# Patient Record
Sex: FEMALE | Race: WHITE | NOT HISPANIC OR LATINO
[De-identification: names, ages, dates, MRNs, and addresses within clinical notes are randomized per-mention and may not be internally consistent; named-entity substitution may affect disease eponyms.]

---

## 2019-01-21 ENCOUNTER — FORM ENCOUNTER (OUTPATIENT)
Age: 64
End: 2019-01-21

## 2019-01-22 ENCOUNTER — OUTPATIENT (OUTPATIENT)
Dept: OUTPATIENT SERVICES | Facility: HOSPITAL | Age: 64
LOS: 1 days | End: 2019-01-22
Payer: COMMERCIAL

## 2019-01-22 ENCOUNTER — APPOINTMENT (OUTPATIENT)
Dept: ORTHOPEDIC SURGERY | Facility: CLINIC | Age: 64
End: 2019-01-22
Payer: COMMERCIAL

## 2019-01-22 VITALS — BODY MASS INDEX: 21.38 KG/M2 | WEIGHT: 133 LBS | HEIGHT: 66 IN

## 2019-01-22 DIAGNOSIS — Z78.9 OTHER SPECIFIED HEALTH STATUS: ICD-10-CM

## 2019-01-22 DIAGNOSIS — Z98.89 OTHER SPECIFIED POSTPROCEDURAL STATES: Chronic | ICD-10-CM

## 2019-01-22 DIAGNOSIS — M16.10 UNILATERAL PRIMARY OSTEOARTHRITIS, UNSPECIFIED HIP: ICD-10-CM

## 2019-01-22 PROCEDURE — 73502 X-RAY EXAM HIP UNI 2-3 VIEWS: CPT | Mod: 26,LT

## 2019-01-22 PROCEDURE — 73502 X-RAY EXAM HIP UNI 2-3 VIEWS: CPT

## 2019-01-22 PROCEDURE — 99203 OFFICE O/P NEW LOW 30 MIN: CPT

## 2019-01-22 NOTE — REASON FOR VISIT
[Initial Visit] : an initial visit for [Artificial Hip Joint] : artificial hip joint [Hip Pain] : hip pain

## 2019-01-22 NOTE — PROCEDURE
[Injection] : Injection [Left] : of the left [Greater Trochanter] : greater trochanteric bursa [Inflammation] : Inflammation [Diagnostic] : Diagnostic [Patient] : patient [Alcohol] : Alcohol [Betadine] : Betadine [20] : a 20-gauge [1% Lidocaine___(mL)] : [unfilled] mL of 1% Lidocaine [Triamcinolone 40mg/mL___(mL)] : [unfilled] ~UmL of 40mg/mL triamcinolone [Tolerated Well] : The patient tolerated the procedure well [None] : none

## 2019-01-23 PROBLEM — Z78.9 DOES NOT USE ILLICIT DRUGS: Status: ACTIVE | Noted: 2019-01-22

## 2019-01-23 PROBLEM — M16.10 ARTHRITIS OF HIP: Status: RESOLVED | Noted: 2019-01-22 | Resolved: 2019-01-23

## 2019-01-23 PROBLEM — Z78.9 CONSUMES ALCOHOL: Status: ACTIVE | Noted: 2019-01-22

## 2019-01-29 NOTE — PHYSICAL EXAM
[de-identified] : Gen: NAD/AAOx3, cooperative\par HEENT: NC/AT\par CV: RRR\par Resp: nonlabored breathing, equal chest rise\par Abd: soft/nontender/nondistended\par R hip:\par - Anterior incision well healed, NTTP, no fluctuance\par - ROM 0-110 flexion, 25IR, 60ER\par - Motor 5/5\par - NVID\par L hip:\par - ROM 0-100 flexion, 20 IR, 50 ER\par - TTP posterolateral aspect of greater trochanter\par - Painless FADIR, trochanteric pain on ANAT\par - Motor 5/5\par - NVID [de-identified] : XR demonstrate R СЕРГЕЙ in unchanged alignment from prior studies. Moderate osteoarthritis of left hip.

## 2019-01-29 NOTE — HISTORY OF PRESENT ILLNESS
[de-identified] : 64y/o female s/p DAA R СЕРГЕЙ by Dr. Alejo 2 years ago, which is doing very well. Complaining of progressive left hip pain centered over the lateral aspect of the greater trochanter, exacerbated by certain activities and movements. Still very active, enjoys multiple sports, doesn't feel limited in her ADLs.

## 2019-01-29 NOTE — DISCUSSION/SUMMARY
[de-identified] : 64y/o female 2y s/p R СЕРГЕЙ, doing well, with L hip OA and trochanteric bursitis\par - Radiographically significant L hip OA does not seem to be producing significant clinical symptoms based on exam, would hold off on СЕРГЕЙ at this point\par - Pt indicated that the trochanteric pain decreased from 5.5/10 to 1.5/10 after injection\par - RTC 2wk for f/u injection if still painful; if symptoms relieved then will space out visits to 6mo...

## 2019-02-05 ENCOUNTER — APPOINTMENT (OUTPATIENT)
Dept: ORTHOPEDIC SURGERY | Facility: CLINIC | Age: 64
End: 2019-02-05

## 2019-04-03 ENCOUNTER — APPOINTMENT (OUTPATIENT)
Dept: ORTHOPEDIC SURGERY | Facility: CLINIC | Age: 64
End: 2019-04-03
Payer: COMMERCIAL

## 2019-04-03 DIAGNOSIS — M70.62 TROCHANTERIC BURSITIS, LEFT HIP: ICD-10-CM

## 2019-04-03 PROCEDURE — 99212 OFFICE O/P EST SF 10 MIN: CPT

## 2019-04-12 ENCOUNTER — RX RENEWAL (OUTPATIENT)
Age: 64
End: 2019-04-12

## 2019-05-17 PROBLEM — M70.62 TROCHANTERIC BURSITIS OF LEFT HIP: Status: ACTIVE | Noted: 2019-01-22

## 2019-05-17 NOTE — ASSESSMENT
[FreeTextEntry1] : Assessment\par #1 moderate to severe left hip arthritis with atypical pain presentation\par \par Plan \par #1 Yulia has a very atypical presentation of her hip arthritis pain, similar to on her right side before that was replaced she always had a right lateral size greater trochanter pain. She is continuing to have the same on her left side now, it didn't give much better after an injection done previously in early spring, she is ready to have a total hip done for the lateral pain and loss of range of motion. We'll schedule this for sometime in the summer. She'll have to undergo the normal preoperative clearance pathway. Risks benefits alternatives explained to clear detail. No other complaints at this time.

## 2019-05-17 NOTE — HISTORY OF PRESENT ILLNESS
[de-identified] : Following of her left hip, she does have moderate arthritis but this presents laterally as trochanter pain and lateral pain. She has minimal if any anterior groin pain which is atypical of hip arthritis pain. She is ready to have a total hip replacement scheduled, she had the same symptoms on her right side before this was replaced now she is doing great. No other complaints at this time.

## 2019-05-17 NOTE — PHYSICAL EXAM
[de-identified] : Gen: NAD/AAOx3, cooperative\par HEENT: NC/AT\par CV: RRR\par Resp: nonlabored breathing, equal chest rise\par Abd: soft/nontender/nondistended\par R hip:\par - Anterior incision well healed, NTTP, no fluctuance\par - ROM 0-110 flexion, 25IR, 60ER\par - Motor 5/5\par - NVID\par L hip:\par - ROM 0-100 flexion, 20 IR, 50 ER\par - TTP posterolateral aspect of greater trochanter\par - Painless FADIR, trochanteric pain on ANAT\par - Motor 5/5\par - NVID

## 2019-06-03 VITALS
OXYGEN SATURATION: 98 % | DIASTOLIC BLOOD PRESSURE: 45 MMHG | HEART RATE: 58 BPM | WEIGHT: 138.23 LBS | HEIGHT: 66 IN | SYSTOLIC BLOOD PRESSURE: 90 MMHG | TEMPERATURE: 98 F | RESPIRATION RATE: 16 BRPM

## 2019-06-03 NOTE — H&P ADULT - NSICDXPASTSURGICALHX_GEN_ALL_CORE_FT
PAST SURGICAL HISTORY:  H/O right inguinal hernia repair     H/O:  section     History of hip replacement, total, right

## 2019-06-03 NOTE — H&P ADULT - PROBLEM SELECTOR PLAN 1
Admit to orthopedics.  Patient presents for elective left total hip arthroplasty.  Medically optimized and cleared by Dr. Dewitt.

## 2019-06-03 NOTE — PATIENT PROFILE ADULT - VISION (WITH CORRECTIVE LENSES IF THE PATIENT USUALLY WEARS THEM):
Normal vision: sees adequately in most situations; can see medication labels, newsprint contacts for distance (not currently in)/Partially impaired: cannot see medication labels or newsprint, but can see obstacles in path, and the surrounding layout; can count fingers at arm's length

## 2019-06-03 NOTE — PATIENT PROFILE ADULT - NSPROMEDSBROUGHTTOHOSP_GEN_A_NUR
no yes/Pt informed on hospital policy for taking own medications while in hospital.  Pt also instructed on safety risks of taking own medications.   Pt verbalized understanding and states she will not take her own medications.

## 2019-06-03 NOTE — PATIENT PROFILE ADULT - NSTRANSFERBELONGINGSDISPO_GEN_A_NUR
on unit on unit/given to security/with patient given to security/clothes and glasses on unit; cash ($20), credit card x 2, cellphone, ipad, wallet, earbuds, wallet (with: cash, ID, insurance card) to/with patient

## 2019-06-03 NOTE — PRE-OP CHECKLIST - PATIENT'S PERSONAL PROPERTY GIVEN TO
clothes and glasses on unit; cellphone, ipad, wallet, earbuds, wallet (with: cash, ID, insurance card) to security/on unit/security/safe on unit/clothes and glasses on unit; cash ($20), credit card x 2, cellphone, ipad, wallet, earbuds, wallet (with: cash, ID, insurance card) to security/security/safe

## 2019-06-03 NOTE — H&P ADULT - NSHPPHYSICALEXAM_GEN_ALL_CORE
General: Alert and oriented, NAD  MSK:  Decreased ROM of left hip secondary to pain.  Remainder of PE as per medical clearance General: Alert and oriented, NAD  MSK: left hip skin intact, no erythema/ecchymosis/sts. SLT INTACT, DP/PT 2+, EHL/TA/GS 5/5.   Decreased ROM of left hip secondary to pain.  Remainder of PE as per medical clearance

## 2019-06-03 NOTE — H&P ADULT - HISTORY OF PRESENT ILLNESS
62 yo F with left hip pain x  Presents today for elective left total hip replacement. 64 yo F with left hip pain x 6months. Pt. states she had right thr performed 3y ago and knew eventually left one would need to get done. Pt. denies any radiation of pain. Pt. is unable to exercise like she wants to 2/2 to pain (swim, run, yoga). Pt. states left hip hurts all the time, but takes meloxicam for pain relief. Pt. was given a left hip injection for what was thought to be bursitis which did not relieve pain.   Presents today for elective left total hip replacement.

## 2019-06-03 NOTE — H&P ADULT - NSHPLABSRESULTS_GEN_ALL_CORE
Preop cbc, bmp, pt/inr, ptt, ua wnl reviewed by medical clearance.  Nasal swab DOS  preop cxr wnl reviewed by medical clearance  preop ekg wnl reviewed by medical clearance

## 2019-06-04 ENCOUNTER — INPATIENT (INPATIENT)
Facility: HOSPITAL | Age: 64
LOS: 0 days | Discharge: HOME | DRG: 470 | End: 2019-06-05
Attending: ORTHOPAEDIC SURGERY | Admitting: ORTHOPAEDIC SURGERY
Payer: COMMERCIAL

## 2019-06-04 ENCOUNTER — TRANSCRIPTION ENCOUNTER (OUTPATIENT)
Age: 64
End: 2019-06-04

## 2019-06-04 ENCOUNTER — APPOINTMENT (OUTPATIENT)
Dept: ORTHOPEDIC SURGERY | Facility: HOSPITAL | Age: 64
End: 2019-06-04
Payer: COMMERCIAL

## 2019-06-04 DIAGNOSIS — Z98.890 OTHER SPECIFIED POSTPROCEDURAL STATES: Chronic | ICD-10-CM

## 2019-06-04 DIAGNOSIS — M19.90 UNSPECIFIED OSTEOARTHRITIS, UNSPECIFIED SITE: ICD-10-CM

## 2019-06-04 DIAGNOSIS — Z98.89 OTHER SPECIFIED POSTPROCEDURAL STATES: Chronic | ICD-10-CM

## 2019-06-04 DIAGNOSIS — Z86.69 PERSONAL HISTORY OF OTHER DISEASES OF THE NERVOUS SYSTEM AND SENSE ORGANS: ICD-10-CM

## 2019-06-04 DIAGNOSIS — Z96.641 PRESENCE OF RIGHT ARTIFICIAL HIP JOINT: Chronic | ICD-10-CM

## 2019-06-04 LAB
BASOPHILS # BLD AUTO: 0.04 K/UL — SIGNIFICANT CHANGE UP (ref 0–0.2)
BASOPHILS NFR BLD AUTO: 0.4 % — SIGNIFICANT CHANGE UP (ref 0–2)
EOSINOPHIL # BLD AUTO: 0.09 K/UL — SIGNIFICANT CHANGE UP (ref 0–0.5)
EOSINOPHIL NFR BLD AUTO: 0.9 % — SIGNIFICANT CHANGE UP (ref 0–6)
HCT VFR BLD CALC: 38.7 % — SIGNIFICANT CHANGE UP (ref 34.5–45)
HGB BLD-MCNC: 12.2 G/DL — SIGNIFICANT CHANGE UP (ref 11.5–15.5)
IMM GRANULOCYTES NFR BLD AUTO: 1 % — SIGNIFICANT CHANGE UP (ref 0–1.5)
LYMPHOCYTES # BLD AUTO: 1.28 K/UL — SIGNIFICANT CHANGE UP (ref 1–3.3)
LYMPHOCYTES # BLD AUTO: 12.6 % — LOW (ref 13–44)
MCHC RBC-ENTMCNC: 31.3 PG — SIGNIFICANT CHANGE UP (ref 27–34)
MCHC RBC-ENTMCNC: 31.5 GM/DL — LOW (ref 32–36)
MCV RBC AUTO: 99.2 FL — SIGNIFICANT CHANGE UP (ref 80–100)
MONOCYTES # BLD AUTO: 0.24 K/UL — SIGNIFICANT CHANGE UP (ref 0–0.9)
MONOCYTES NFR BLD AUTO: 2.4 % — SIGNIFICANT CHANGE UP (ref 2–14)
NEUTROPHILS # BLD AUTO: 8.39 K/UL — HIGH (ref 1.8–7.4)
NEUTROPHILS NFR BLD AUTO: 82.7 % — HIGH (ref 43–77)
NRBC # BLD: 0 /100 WBCS — SIGNIFICANT CHANGE UP (ref 0–0)
PLATELET # BLD AUTO: 172 K/UL — SIGNIFICANT CHANGE UP (ref 150–400)
RBC # BLD: 3.9 M/UL — SIGNIFICANT CHANGE UP (ref 3.8–5.2)
RBC # FLD: 12.8 % — SIGNIFICANT CHANGE UP (ref 10.3–14.5)
WBC # BLD: 10.14 K/UL — SIGNIFICANT CHANGE UP (ref 3.8–10.5)
WBC # FLD AUTO: 10.14 K/UL — SIGNIFICANT CHANGE UP (ref 3.8–10.5)

## 2019-06-04 PROCEDURE — 27130 TOTAL HIP ARTHROPLASTY: CPT | Mod: LT

## 2019-06-04 PROCEDURE — 99223 1ST HOSP IP/OBS HIGH 75: CPT

## 2019-06-04 PROCEDURE — 72170 X-RAY EXAM OF PELVIS: CPT | Mod: 26

## 2019-06-04 RX ORDER — HYDROMORPHONE HYDROCHLORIDE 2 MG/ML
0.5 INJECTION INTRAMUSCULAR; INTRAVENOUS; SUBCUTANEOUS EVERY 4 HOURS
Refills: 0 | Status: DISCONTINUED | OUTPATIENT
Start: 2019-06-04 | End: 2019-06-04

## 2019-06-04 RX ORDER — SENNA PLUS 8.6 MG/1
2 TABLET ORAL AT BEDTIME
Refills: 0 | Status: DISCONTINUED | OUTPATIENT
Start: 2019-06-04 | End: 2019-06-05

## 2019-06-04 RX ORDER — MORPHINE SULFATE 50 MG/1
4 CAPSULE, EXTENDED RELEASE ORAL ONCE
Refills: 0 | Status: DISCONTINUED | OUTPATIENT
Start: 2019-06-04 | End: 2019-06-05

## 2019-06-04 RX ORDER — OXYCODONE HYDROCHLORIDE 5 MG/1
5 TABLET ORAL EVERY 4 HOURS
Refills: 0 | Status: DISCONTINUED | OUTPATIENT
Start: 2019-06-04 | End: 2019-06-05

## 2019-06-04 RX ORDER — DIPHENHYDRAMINE HCL 50 MG
25 CAPSULE ORAL AT BEDTIME
Refills: 0 | Status: DISCONTINUED | OUTPATIENT
Start: 2019-06-04 | End: 2019-06-05

## 2019-06-04 RX ORDER — MAGNESIUM HYDROXIDE 400 MG/1
30 TABLET, CHEWABLE ORAL DAILY
Refills: 0 | Status: DISCONTINUED | OUTPATIENT
Start: 2019-06-04 | End: 2019-06-05

## 2019-06-04 RX ORDER — POLYETHYLENE GLYCOL 3350 17 G/17G
17 POWDER, FOR SOLUTION ORAL DAILY
Refills: 0 | Status: DISCONTINUED | OUTPATIENT
Start: 2019-06-04 | End: 2019-06-05

## 2019-06-04 RX ORDER — MORPHINE SULFATE 50 MG/1
2 CAPSULE, EXTENDED RELEASE ORAL
Refills: 0 | Status: DISCONTINUED | OUTPATIENT
Start: 2019-06-04 | End: 2019-06-05

## 2019-06-04 RX ORDER — ASPIRIN/CALCIUM CARB/MAGNESIUM 324 MG
325 TABLET ORAL
Refills: 0 | Status: DISCONTINUED | OUTPATIENT
Start: 2019-06-04 | End: 2019-06-05

## 2019-06-04 RX ORDER — CELECOXIB 200 MG/1
200 CAPSULE ORAL
Refills: 0 | Status: DISCONTINUED | OUTPATIENT
Start: 2019-06-04 | End: 2019-06-05

## 2019-06-04 RX ORDER — TRAMADOL HYDROCHLORIDE 50 MG/1
50 TABLET ORAL EVERY 8 HOURS
Refills: 0 | Status: DISCONTINUED | OUTPATIENT
Start: 2019-06-04 | End: 2019-06-05

## 2019-06-04 RX ORDER — KETOROLAC TROMETHAMINE 30 MG/ML
15 SYRINGE (ML) INJECTION ONCE
Refills: 0 | Status: DISCONTINUED | OUTPATIENT
Start: 2019-06-04 | End: 2019-06-05

## 2019-06-04 RX ORDER — CEFAZOLIN SODIUM 1 G
2000 VIAL (EA) INJECTION EVERY 8 HOURS
Refills: 0 | Status: DISCONTINUED | OUTPATIENT
Start: 2019-06-04 | End: 2019-06-04

## 2019-06-04 RX ORDER — DOCUSATE SODIUM 100 MG
100 CAPSULE ORAL THREE TIMES A DAY
Refills: 0 | Status: DISCONTINUED | OUTPATIENT
Start: 2019-06-04 | End: 2019-06-05

## 2019-06-04 RX ORDER — PANTOPRAZOLE SODIUM 20 MG/1
40 TABLET, DELAYED RELEASE ORAL
Refills: 0 | Status: DISCONTINUED | OUTPATIENT
Start: 2019-06-04 | End: 2019-06-05

## 2019-06-04 RX ORDER — OXYCODONE HYDROCHLORIDE 5 MG/1
10 TABLET ORAL EVERY 4 HOURS
Refills: 0 | Status: DISCONTINUED | OUTPATIENT
Start: 2019-06-04 | End: 2019-06-05

## 2019-06-04 RX ORDER — SODIUM CHLORIDE 9 MG/ML
1000 INJECTION, SOLUTION INTRAVENOUS
Refills: 0 | Status: DISCONTINUED | OUTPATIENT
Start: 2019-06-04 | End: 2019-06-05

## 2019-06-04 RX ORDER — ONDANSETRON 8 MG/1
4 TABLET, FILM COATED ORAL EVERY 6 HOURS
Refills: 0 | Status: DISCONTINUED | OUTPATIENT
Start: 2019-06-04 | End: 2019-06-05

## 2019-06-04 RX ORDER — ACETAMINOPHEN 500 MG
650 TABLET ORAL EVERY 6 HOURS
Refills: 0 | Status: DISCONTINUED | OUTPATIENT
Start: 2019-06-04 | End: 2019-06-05

## 2019-06-04 RX ORDER — CEFAZOLIN SODIUM 1 G
2000 VIAL (EA) INJECTION EVERY 8 HOURS
Refills: 0 | Status: COMPLETED | OUTPATIENT
Start: 2019-06-04 | End: 2019-06-04

## 2019-06-04 RX ADMIN — Medication 100 MILLIGRAM(S): at 22:02

## 2019-06-04 RX ADMIN — TRAMADOL HYDROCHLORIDE 50 MILLIGRAM(S): 50 TABLET ORAL at 23:02

## 2019-06-04 RX ADMIN — TRAMADOL HYDROCHLORIDE 50 MILLIGRAM(S): 50 TABLET ORAL at 15:56

## 2019-06-04 RX ADMIN — Medication 2000 MILLIGRAM(S): at 22:02

## 2019-06-04 RX ADMIN — Medication 650 MILLIGRAM(S): at 18:13

## 2019-06-04 RX ADMIN — SODIUM CHLORIDE 120 MILLILITER(S): 9 INJECTION, SOLUTION INTRAVENOUS at 22:02

## 2019-06-04 RX ADMIN — Medication 325 MILLIGRAM(S): at 18:13

## 2019-06-04 RX ADMIN — TRAMADOL HYDROCHLORIDE 50 MILLIGRAM(S): 50 TABLET ORAL at 22:02

## 2019-06-04 RX ADMIN — TRAMADOL HYDROCHLORIDE 50 MILLIGRAM(S): 50 TABLET ORAL at 15:27

## 2019-06-04 RX ADMIN — Medication 2000 MILLIGRAM(S): at 15:27

## 2019-06-04 RX ADMIN — Medication 100 MILLIGRAM(S): at 15:26

## 2019-06-04 RX ADMIN — Medication 650 MILLIGRAM(S): at 23:31

## 2019-06-04 NOTE — PHYSICAL THERAPY INITIAL EVALUATION ADULT - CRITERIA FOR SKILLED THERAPEUTIC INTERVENTIONS
rehab potential/anticipated discharge recommendation/therapy frequency/impairments found/anticipated equipment needs at discharge

## 2019-06-04 NOTE — CONSULT NOTE ADULT - SUBJECTIVE AND OBJECTIVE BOX
CC: Pain is controlled. No complaints.   Feeling well.   Tolerates PO diet (+); Urination (+); Walked with PT (+)  Denies cp, sob, dizziness, HA, abdominal pain, n/v.  Rest of ROS negative.     Vital Signs Last 24 Hrs  T(C): 36.2 (04 Jun 2019 16:27), Max: 36.4 (04 Jun 2019 12:56)  T(F): 97.2 (04 Jun 2019 16:27), Max: 97.6 (04 Jun 2019 14:02)  HR: 59 (04 Jun 2019 16:27) (50 - 72)  BP: 100/62 (04 Jun 2019 16:27) (93/50 - 106/55)  BP(mean): 70 (04 Jun 2019 13:26) (67 - 79)  RR: 18 (04 Jun 2019 16:27) (12 - 20)  SpO2: 97% (04 Jun 2019 16:27) (94% - 100%)    PHYSICAL EXAMINATION  * General: Not in acute distress. Awake and alert. Lying comfortably in bed.  * Head: Normocephalic, atraumatic.  * HEENT: ears no discharge, eyes PERRLA, nose no discharge, throat no exudates, normal tonsils.  * Neck: no JVD, supple.  * Lungs: Clear to auscultation, no rales, no wheezes.  * Cardio: Regular rate and rhythm, no murmurs, no rubs, no gallops. Good peripheral pulses.  * Abdomen: Soft, non-tender, non-distended, tympanic to percussion, no rebound, no guarding, no rigidity. Bowel sounds present. No suprapubic or CVA tenderness.  * Extremities: Acyanotic, no edema.  * Skin: Warm and dry.  * Neuro: Alert and oriented x 3. No focal deficits. Motor strength is 5/5 throughout. Sensation intact. Cranial nerves II-XII grossly intact.                           12.2   10.14 )-----------( 172      ( 04 Jun 2019 10:39 )             38.7     MEDICATIONS  (STANDING):  acetaminophen   Tablet .. 650 milliGRAM(s) Oral every 6 hours  aspirin enteric coated 325 milliGRAM(s) Oral two times a day  ceFAZolin  Injectable. 2000 milliGRAM(s) IV Push every 8 hours  celecoxib 200 milliGRAM(s) Oral two times a day  docusate sodium 100 milliGRAM(s) Oral three times a day  ketorolac   Injectable 15 milliGRAM(s) IV Push once  lactated ringers. 1000 milliLiter(s) (120 mL/Hr) IV Continuous <Continuous>  morphine  - Injectable 4 milliGRAM(s) IV Push once  pantoprazole    Tablet 40 milliGRAM(s) Oral before breakfast  polyethylene glycol 3350 17 Gram(s) Oral daily  traMADol 50 milliGRAM(s) Oral every 8 hours    MEDICATIONS  (PRN):  aluminum hydroxide/magnesium hydroxide/simethicone Suspension 30 milliLiter(s) Oral four times a day PRN Indigestion  diphenhydrAMINE 25 milliGRAM(s) Oral at bedtime PRN Insomnia  HYDROmorphone  Injectable 0.5 milliGRAM(s) IV Push every 4 hours PRN Severe Pain (7 - 10)  magnesium hydroxide Suspension 30 milliLiter(s) Oral daily PRN Constipation  morphine  - Injectable 2 milliGRAM(s) IV Push every 3 hours PRN Severe Pain (7 - 10)  ondansetron Injectable 4 milliGRAM(s) IV Push every 6 hours PRN Nausea and/or Vomiting  oxyCODONE    IR 5 milliGRAM(s) Oral every 4 hours PRN Mild Pain (1 - 3)  oxyCODONE    IR 10 milliGRAM(s) Oral every 4 hours PRN Moderate Pain (4 - 6)  senna 2 Tablet(s) Oral at bedtime PRN Constipation

## 2019-06-04 NOTE — CONSULT NOTE ADULT - ASSESSMENT
64 yo F, PMH of hip OA and sleep apnea. c/o chronic left hip pain x 6 months. Admitted for elective left total hip replacement by Dr. Quezada, now POD-0. Medicine consulted for comanagement.    1) Post-op state  * OOB-C  * Physical therapy evaluation -did 1st session with PT. rec for home.  * Aggressive incentive spirometry  * Pain control and bowel regimen  * Mechanical LE ppx     2) VTE ppx  *  bid.   * PPI po.

## 2019-06-04 NOTE — CONSULT NOTE ADULT - CONSULT REASON
Co-management    64 yo F, PMH of hip OA and sleep apnea. c/o chronic left hip pain x 6 months. Admitted for elective left total hip replacement by Dr. Quezada, now POD-0. Medicine consulted for comanagement.    PAST MEDICAL & SURGICAL HISTORY:  Hyperlipidemia: per H&amp;P, pt denies  H/O sleep apnea: uses mouth guard.  OA (osteoarthritis)  DJD (degenerative joint disease)  History of nasal septoplasty  History of hip replacement, total, right  H/O right inguinal hernia repair  H/O:  section    Social: Denies smoking, etoh, drugs.     Allergies: NKDA    Home Medications:  aspirin 81 mg oral tablet: 1 tab(s) orally once a day (2019 06:29)  Caltrate 600 mg oral tablet: 1 tab(s) orally once a day (2019 06:29)  Fish Oil oral capsule: 1 cap(s) orally once a day (2019 06:29)  Ginger Root oral capsule: 1 cap(s) orally once a day (2019 06:29)  meloxicam 15 mg oral tablet: 1 tab(s) orally once a day, As Needed (2019 06:29)  Multiple Vitamins oral capsule: 1 cap(s) orally once a day (2019 06:29)  Probiotic Formula oral capsule: 1 cap(s) orally once a day (2019 06:29)  Turmeric 500 mg oral capsule: 2 cap(s) orally once a day (2019 06:29)  Vitamin B-100 oral tablet: 1 tab(s) orally once a day (2019 06:29)

## 2019-06-05 ENCOUNTER — TRANSCRIPTION ENCOUNTER (OUTPATIENT)
Age: 64
End: 2019-06-05

## 2019-06-05 VITALS
HEART RATE: 56 BPM | SYSTOLIC BLOOD PRESSURE: 91 MMHG | RESPIRATION RATE: 16 BRPM | DIASTOLIC BLOOD PRESSURE: 54 MMHG | OXYGEN SATURATION: 97 %

## 2019-06-05 LAB
ANION GAP SERPL CALC-SCNC: 7 MMOL/L — SIGNIFICANT CHANGE UP (ref 5–17)
BUN SERPL-MCNC: 15 MG/DL — SIGNIFICANT CHANGE UP (ref 7–23)
CALCIUM SERPL-MCNC: 9.7 MG/DL — SIGNIFICANT CHANGE UP (ref 8.4–10.5)
CHLORIDE SERPL-SCNC: 105 MMOL/L — SIGNIFICANT CHANGE UP (ref 96–108)
CO2 SERPL-SCNC: 29 MMOL/L — SIGNIFICANT CHANGE UP (ref 22–31)
CREAT SERPL-MCNC: 0.66 MG/DL — SIGNIFICANT CHANGE UP (ref 0.5–1.3)
GLUCOSE SERPL-MCNC: 117 MG/DL — HIGH (ref 70–99)
HCT VFR BLD CALC: 33 % — LOW (ref 34.5–45)
HCV AB S/CO SERPL IA: 0.13 S/CO — SIGNIFICANT CHANGE UP
HCV AB SERPL-IMP: SIGNIFICANT CHANGE UP
HGB BLD-MCNC: 10.7 G/DL — LOW (ref 11.5–15.5)
MCHC RBC-ENTMCNC: 31.8 PG — SIGNIFICANT CHANGE UP (ref 27–34)
MCHC RBC-ENTMCNC: 32.4 GM/DL — SIGNIFICANT CHANGE UP (ref 32–36)
MCV RBC AUTO: 97.9 FL — SIGNIFICANT CHANGE UP (ref 80–100)
MRSA PCR RESULT.: SIGNIFICANT CHANGE UP
NRBC # BLD: 0 /100 WBCS — SIGNIFICANT CHANGE UP (ref 0–0)
PLATELET # BLD AUTO: 177 K/UL — SIGNIFICANT CHANGE UP (ref 150–400)
POTASSIUM SERPL-MCNC: 4.7 MMOL/L — SIGNIFICANT CHANGE UP (ref 3.5–5.3)
POTASSIUM SERPL-SCNC: 4.7 MMOL/L — SIGNIFICANT CHANGE UP (ref 3.5–5.3)
RBC # BLD: 3.37 M/UL — LOW (ref 3.8–5.2)
RBC # FLD: 12.9 % — SIGNIFICANT CHANGE UP (ref 10.3–14.5)
S AUREUS DNA NOSE QL NAA+PROBE: SIGNIFICANT CHANGE UP
SODIUM SERPL-SCNC: 141 MMOL/L — SIGNIFICANT CHANGE UP (ref 135–145)
WBC # BLD: 11.07 K/UL — HIGH (ref 3.8–10.5)
WBC # FLD AUTO: 11.07 K/UL — HIGH (ref 3.8–10.5)

## 2019-06-05 PROCEDURE — 99232 SBSQ HOSP IP/OBS MODERATE 35: CPT

## 2019-06-05 RX ORDER — ASPIRIN/CALCIUM CARB/MAGNESIUM 324 MG
1 TABLET ORAL
Qty: 60 | Refills: 0
Start: 2019-06-05 | End: 2019-07-04

## 2019-06-05 RX ORDER — TRAMADOL HYDROCHLORIDE 50 MG/1
25 TABLET ORAL EVERY 4 HOURS
Refills: 0 | Status: DISCONTINUED | OUTPATIENT
Start: 2019-06-05 | End: 2019-06-05

## 2019-06-05 RX ORDER — MELOXICAM 15 MG/1
1 TABLET ORAL
Qty: 30 | Refills: 0
Start: 2019-06-05 | End: 2019-07-04

## 2019-06-05 RX ORDER — TRAMADOL HYDROCHLORIDE 50 MG/1
1 TABLET ORAL
Qty: 40 | Refills: 0
Start: 2019-06-05 | End: 2019-06-11

## 2019-06-05 RX ORDER — ACETAMINOPHEN 500 MG
2 TABLET ORAL
Qty: 0 | Refills: 0 | DISCHARGE
Start: 2019-06-05

## 2019-06-05 RX ORDER — TRAMADOL HYDROCHLORIDE 50 MG/1
50 TABLET ORAL EVERY 4 HOURS
Refills: 0 | Status: DISCONTINUED | OUTPATIENT
Start: 2019-06-05 | End: 2019-06-05

## 2019-06-05 RX ORDER — OMEGA-3 ACID ETHYL ESTERS 1 G
1 CAPSULE ORAL
Qty: 0 | Refills: 0 | DISCHARGE

## 2019-06-05 RX ORDER — KETOROLAC TROMETHAMINE 30 MG/ML
15 SYRINGE (ML) INJECTION EVERY 8 HOURS
Refills: 0 | Status: DISCONTINUED | OUTPATIENT
Start: 2019-06-05 | End: 2019-06-05

## 2019-06-05 RX ORDER — DOCUSATE SODIUM 100 MG
1 CAPSULE ORAL
Qty: 0 | Refills: 0 | DISCHARGE
Start: 2019-06-05

## 2019-06-05 RX ORDER — SENNA PLUS 8.6 MG/1
2 TABLET ORAL
Qty: 0 | Refills: 0 | DISCHARGE
Start: 2019-06-05

## 2019-06-05 RX ORDER — POLYETHYLENE GLYCOL 3350 17 G/17G
17 POWDER, FOR SOLUTION ORAL
Qty: 0 | Refills: 0 | DISCHARGE
Start: 2019-06-05

## 2019-06-05 RX ORDER — MORPHINE SULFATE 50 MG/1
2 CAPSULE, EXTENDED RELEASE ORAL
Refills: 0 | Status: DISCONTINUED | OUTPATIENT
Start: 2019-06-05 | End: 2019-06-05

## 2019-06-05 RX ORDER — ASPIRIN/CALCIUM CARB/MAGNESIUM 324 MG
1 TABLET ORAL
Qty: 0 | Refills: 0 | DISCHARGE

## 2019-06-05 RX ORDER — MELOXICAM 15 MG/1
1 TABLET ORAL
Qty: 0 | Refills: 0 | DISCHARGE

## 2019-06-05 RX ADMIN — Medication 100 MILLIGRAM(S): at 06:12

## 2019-06-05 RX ADMIN — PANTOPRAZOLE SODIUM 40 MILLIGRAM(S): 20 TABLET, DELAYED RELEASE ORAL at 06:12

## 2019-06-05 RX ADMIN — POLYETHYLENE GLYCOL 3350 17 GRAM(S): 17 POWDER, FOR SOLUTION ORAL at 12:46

## 2019-06-05 RX ADMIN — TRAMADOL HYDROCHLORIDE 50 MILLIGRAM(S): 50 TABLET ORAL at 06:12

## 2019-06-05 RX ADMIN — Medication 650 MILLIGRAM(S): at 06:11

## 2019-06-05 RX ADMIN — SODIUM CHLORIDE 120 MILLILITER(S): 9 INJECTION, SOLUTION INTRAVENOUS at 06:11

## 2019-06-05 RX ADMIN — Medication 650 MILLIGRAM(S): at 00:31

## 2019-06-05 RX ADMIN — Medication 15 MILLIGRAM(S): at 13:09

## 2019-06-05 RX ADMIN — Medication 325 MILLIGRAM(S): at 06:12

## 2019-06-05 RX ADMIN — Medication 650 MILLIGRAM(S): at 12:46

## 2019-06-05 RX ADMIN — Medication 100 MILLIGRAM(S): at 13:09

## 2019-06-05 NOTE — DISCHARGE NOTE PROVIDER - HOSPITAL COURSE
Admit    OR left total hip replacement (anterior lateral approach)    Periop Antibx    DVT ppx    PT /OT- anterior and posterior hip precautions    Pain mgt    medical consult

## 2019-06-05 NOTE — PROGRESS NOTE ADULT - ASSESSMENT
62 yo F, PMH of hip OA and sleep apnea. c/o chronic left hip pain x 6 months. Admitted for elective left total hip replacement by Dr. Quezada, now POD-1. Medicine consulted for comanagement.    1) Post-op state  * OOB-C  * Physical therapy, cleared for home.  * Aggressive incentive spirometry  * Pain control and bowel regimen  * Mechanical LE ppx     2) VTE ppx  *  bid.   * PPI po.

## 2019-06-05 NOTE — PROGRESS NOTE ADULT - SUBJECTIVE AND OBJECTIVE BOX
Orthopaedic Surgery Post Operative Check    Procedure: Left total hip replacement   Surgeon: Dr. Quezada     Pt comfortable without complaints, pain controlled  Denies CP, SOB, N/V, numbness/tingling    Vital Signs Last 24 Hrs  T(C): 36.4 (06-04-19 @ 14:02), Max: 36.4 (06-04-19 @ 12:56)  T(F): 97.6 (06-04-19 @ 14:02), Max: 97.6 (06-04-19 @ 14:02)  HR: 71 (06-04-19 @ 14:02) (50 - 71)  BP: 101/58 (06-04-19 @ 14:02) (93/50 - 106/55)  BP(mean): 70 (06-04-19 @ 13:26) (67 - 79)  RR: 18 (06-04-19 @ 14:02) (12 - 20)  SpO2: 97% (06-04-19 @ 14:02) (94% - 100%)  AVSS    General: Pt Alert and oriented, NAD  DSG C/D/I left hip guaze/tegaderm   Pulses: DP pulses palpable bilateral lower extremities   Sensation: intact and equal to bilateral lower extremities   Motor: EHL/FHL/TA/GS 5/5 motor strength bilateral lower extremities                           12.2   10.14 )-----------( 172      ( 04 Jun 2019 10:39 )             38.7           Post-op X-Ray: prosthesis in place     A/P: 63yFemale POD#0 s/p   - Stable  - Pain Control  - DVT ppx: ASA/SCDs  - Post op abx: Ancef  - PT, WBS: PWB LLE   - f/u AM labs     Ortho Pager [phone]
CC: No overnight events, no complaints.   Feeling well, pain is overall controlled.  Tolerates PO diet (+); Urination (+); Walked with PT (+)  Denies cp, sob, dizziness, HA, abdominal pain, n/v.  Rest of ROS negative.     Vital Signs Last 24 Hrs  T(C): 36.2 (05 Jun 2019 06:26), Max: 36.8 (04 Jun 2019 22:17)  T(F): 97.1 (05 Jun 2019 06:26), Max: 98.2 (04 Jun 2019 22:17)  HR: 56 (05 Jun 2019 07:14) (52 - 59)  BP: 91/54 (05 Jun 2019 07:14) (90/55 - 100/62)  BP(mean): --  RR: 16 (05 Jun 2019 07:14) (16 - 18)  SpO2: 97% (05 Jun 2019 07:14) (93% - 97%)    PHYSICAL EXAMINATION  * General: Not in acute distress. Awake and alert. Lying comfortably in bed.  * Head: Normocephalic, atraumatic.  * HEENT: ears no discharge, eyes PERRLA, nose no discharge, throat no exudates, normal tonsils.  * Neck: no JVD, supple.  * Lungs: Clear to auscultation, no rales, no wheezes.  * Cardio: Regular rate and rhythm, no murmurs, no rubs, no gallops. Good peripheral pulses.  * Abdomen: Soft, non-tender, non-distended, tympanic to percussion, no rebound, no guarding, no rigidity. Bowel sounds present. No suprapubic or CVA tenderness.  * : Deferred.  * Extremities: Acyanotic, no edema.  * Skin: Warm and dry.  * Neuro: Alert and oriented x 3. No focal deficits. Motor strength is 5/5 throughout. Sensation intact. Cranial nerves II-XII grossly intact.                           10.7   11.07 )-----------( 177      ( 05 Jun 2019 06:48 )             33.0   06-05    141  |  105  |  15  ----------------------------<  117<H>  4.7   |  29  |  0.66    Ca    9.7      05 Jun 2019 06:48    MEDICATIONS  (STANDING):  acetaminophen   Tablet .. 650 milliGRAM(s) Oral every 6 hours  aspirin enteric coated 325 milliGRAM(s) Oral two times a day  celecoxib 200 milliGRAM(s) Oral two times a day  docusate sodium 100 milliGRAM(s) Oral three times a day  ketorolac   Injectable 15 milliGRAM(s) IV Push every 8 hours  pantoprazole    Tablet 40 milliGRAM(s) Oral before breakfast  polyethylene glycol 3350 17 Gram(s) Oral daily    MEDICATIONS  (PRN):  aluminum hydroxide/magnesium hydroxide/simethicone Suspension 30 milliLiter(s) Oral four times a day PRN Indigestion  morphine  - Injectable 2 milliGRAM(s) IV Push every 2 hours PRN breakthrough pain  ondansetron Injectable 4 milliGRAM(s) IV Push every 6 hours PRN Nausea and/or Vomiting  senna 2 Tablet(s) Oral at bedtime PRN Constipation  traMADol 25 milliGRAM(s) Oral every 4 hours PRN Moderate Pain (4 - 6)  traMADol 50 milliGRAM(s) Oral every 4 hours PRN Severe Pain (7 - 10)
Ortho Note    Pt comfortable without complaints, pain controlled  Denies CP, SOB, N/V, numbness/tingling     Vital Signs Last 24 Hrs  T(C): 36.2 (06-05-19 @ 06:26), Max: 36.2 (06-05-19 @ 06:26)  T(F): 97.1 (06-05-19 @ 06:26), Max: 97.1 (06-05-19 @ 06:26)  HR: 56 (06-05-19 @ 07:14) (56 - 57)  BP: 91/54 (06-05-19 @ 07:14) (91/54 - 94/56)  BP(mean): --  RR: 16 (06-05-19 @ 07:14) (16 - 16)  SpO2: 97% (06-05-19 @ 07:14) (93% - 97%)    General: Pt Alert and oriented, NAD  R hip DSG C/D/I  Pulses: 2+ DP/PT  Sensation: s/s/sp/dp/t intact  Motor: EHL/FHL/TA/GS 5/5                          10.7   11.07 )-----------( 177      ( 05 Jun 2019 06:48 )             33.0   05 Jun 2019 06:48    141    |  105    |  15     ----------------------------<  117    4.7     |  29     |  0.66     Ca    9.7        05 Jun 2019 06:48        A/P: 63F s/p L СЕРГЕЙ 6/4  - Stable  - Pain Control  - DVT ppx: ASA  - PT, WBS: WBAT  - dispo: home     Ortho Pager [phone]

## 2019-06-05 NOTE — OCCUPATIONAL THERAPY INITIAL EVALUATION ADULT - PERTINENT HX OF CURRENT PROBLEM, REHAB EVAL
64 yo F with left hip pain x 6months. Pt. states she had right thr performed 3y ago and knew eventually left one would need to get done. Pt. denies any radiation of pain. Pt. is unable to exercise like she wants to 2/2 to pain (swim, run, yoga). Pt. states left hip hurts all the time, but takes meloxicam for pain relief. Pt. was given a left hip injection for what was thought to be bursitis which did not relieve pain.

## 2019-06-05 NOTE — DISCHARGE NOTE NURSING/CASE MANAGEMENT/SOCIAL WORK - NSTRANSFERBELONGINGSDISPO_GEN_A_NUR
clothes and glasses on unit; cash ($20), credit card x 2, cellphone, ipad, wallet, earbuds, wallet (with: cash, ID, insurance card) to/given to security/with patient

## 2019-06-05 NOTE — DISCHARGE NOTE PROVIDER - NSDCFUADDINST_GEN_ALL_CORE_FT
50% protected weight on left leg operated hip using a rolling walker  Anterior and posterior hip precautions to prevent hip dislocation  No crossing your legs. Do not bend past your waist.  Use long-handled reach to pick things up if purchased.  Sit in a high chair.  If you do not have a high chair, use cushions on the chair  No strenuous activity, heavy lifting, driving or returning to work until cleared by MD.  You may shower - dressing is water-resistant, no soaking in bathtubs.  Keep dressing on your hip until the follow up appointment.  Try to have regular bowel movements, take stool softener or laxative if necessary.  May take Pepcid or Zantac for upset stomach.  Swelling may travel all the way down leg to foot, this is normal and will subside in a few weeks.  Call to schedule an appt with Dr. Quezada for follow up.    Contact your doctor if you experience: fever greater than 101.5, chills, chest pain, difficulty breathing, redness or excessive drainage around the incision, other concerns.  Follow up with your primary care provider.

## 2019-06-05 NOTE — DISCHARGE NOTE PROVIDER - CARE PROVIDER_API CALL
Daniel Quezada)  Orthopaedic Surgery  [address]  [place], [place] [address]  Phone: [phone]  Fax: [phone]  Follow Up Time:

## 2019-06-05 NOTE — OCCUPATIONAL THERAPY INITIAL EVALUATION ADULT - MANUAL MUSCLE TESTING RESULTS, REHAB EVAL
no strength deficits were identified/Bilateral upper extremities at least 3+/5 based on functional assessment

## 2019-06-05 NOTE — DISCHARGE NOTE NURSING/CASE MANAGEMENT/SOCIAL WORK - NSDCDPATPORTLINK_GEN_ALL_CORE
You can access the [facility] Portal, offered by [facility], by registering with the following website: [email or web address]

## 2019-06-05 NOTE — DISCHARGE NOTE PROVIDER - NSDCCPCAREPLAN_GEN_ALL_CORE_FT
PRINCIPAL DISCHARGE DIAGNOSIS  Diagnosis: DJD (degenerative joint disease)  Assessment and Plan of Treatment: DJD (degenerative joint disease)

## 2019-06-06 PROCEDURE — 86850 RBC ANTIBODY SCREEN: CPT

## 2019-06-06 PROCEDURE — 72170 X-RAY EXAM OF PELVIS: CPT

## 2019-06-06 PROCEDURE — 86803 HEPATITIS C AB TEST: CPT

## 2019-06-06 PROCEDURE — 36415 COLL VENOUS BLD VENIPUNCTURE: CPT

## 2019-06-06 PROCEDURE — 80048 BASIC METABOLIC PNL TOTAL CA: CPT

## 2019-06-06 PROCEDURE — 85027 COMPLETE CBC AUTOMATED: CPT

## 2019-06-06 PROCEDURE — 97116 GAIT TRAINING THERAPY: CPT

## 2019-06-06 PROCEDURE — C1713: CPT

## 2019-06-06 PROCEDURE — C1776: CPT

## 2019-06-06 PROCEDURE — 97161 PT EVAL LOW COMPLEX 20 MIN: CPT

## 2019-06-06 PROCEDURE — 85025 COMPLETE CBC W/AUTO DIFF WBC: CPT

## 2019-06-06 PROCEDURE — 86901 BLOOD TYPING SEROLOGIC RH(D): CPT

## 2019-06-06 PROCEDURE — 86900 BLOOD TYPING SEROLOGIC ABO: CPT

## 2019-06-10 DIAGNOSIS — M16.12 UNILATERAL PRIMARY OSTEOARTHRITIS, LEFT HIP: ICD-10-CM

## 2019-06-10 DIAGNOSIS — Z79.82 LONG TERM (CURRENT) USE OF ASPIRIN: ICD-10-CM

## 2019-06-10 DIAGNOSIS — G47.33 OBSTRUCTIVE SLEEP APNEA (ADULT) (PEDIATRIC): ICD-10-CM

## 2019-06-10 DIAGNOSIS — M25.552 PAIN IN LEFT HIP: ICD-10-CM

## 2019-06-10 DIAGNOSIS — Z96.641 PRESENCE OF RIGHT ARTIFICIAL HIP JOINT: ICD-10-CM

## 2019-06-11 PROBLEM — E78.5 HYPERLIPIDEMIA, UNSPECIFIED: Chronic | Status: ACTIVE | Noted: 2019-06-04

## 2019-06-11 PROBLEM — Z86.69 PERSONAL HISTORY OF OTHER DISEASES OF THE NERVOUS SYSTEM AND SENSE ORGANS: Chronic | Status: ACTIVE | Noted: 2019-06-03

## 2019-06-11 PROBLEM — M19.90 UNSPECIFIED OSTEOARTHRITIS, UNSPECIFIED SITE: Chronic | Status: ACTIVE | Noted: 2019-06-03

## 2019-06-19 ENCOUNTER — APPOINTMENT (OUTPATIENT)
Dept: ORTHOPEDIC SURGERY | Facility: CLINIC | Age: 64
End: 2019-06-19
Payer: COMMERCIAL

## 2019-06-19 PROCEDURE — 99024 POSTOP FOLLOW-UP VISIT: CPT

## 2019-06-19 NOTE — HISTORY OF PRESENT ILLNESS
[de-identified] : Following up for her left total hip, has well-controlled pain, no other complaints at this time.

## 2019-06-19 NOTE — ASSESSMENT
[FreeTextEntry1] : Assessment\par #1 status post left total hip\par Plan\par #1 wound care instructions given\par #2 continue aspirin until one month anniversary\par #3 begin physical therapy at one month anniversary, prescription given\par #4 followup at 10 week anniversary, x-rays at the next visit

## 2019-06-19 NOTE — PHYSICAL EXAM
[de-identified] : General: Not in acute distress, dressed appropriately, sitting on examination table\par Skin: Warm and dry, normal turgor, no rashes\par Neurological: AOx3, Cranial nerves grossly in tact\par Psych: Mood and affect appropriate\par \par Left Hip: Well healed surgical incision. No swelling edema erythema redness or drainage. Nontender.. ROM: Not assessed. 5/5 strength. Normal gait. \par

## 2019-08-20 ENCOUNTER — FORM ENCOUNTER (OUTPATIENT)
Age: 64
End: 2019-08-20

## 2019-08-21 ENCOUNTER — APPOINTMENT (OUTPATIENT)
Dept: ORTHOPEDIC SURGERY | Facility: CLINIC | Age: 64
End: 2019-08-21
Payer: COMMERCIAL

## 2019-08-21 ENCOUNTER — OUTPATIENT (OUTPATIENT)
Dept: OUTPATIENT SERVICES | Facility: HOSPITAL | Age: 64
LOS: 1 days | End: 2019-08-21
Payer: COMMERCIAL

## 2019-08-21 DIAGNOSIS — Z96.641 PRESENCE OF RIGHT ARTIFICIAL HIP JOINT: Chronic | ICD-10-CM

## 2019-08-21 DIAGNOSIS — Z98.890 OTHER SPECIFIED POSTPROCEDURAL STATES: Chronic | ICD-10-CM

## 2019-08-21 DIAGNOSIS — Z98.89 OTHER SPECIFIED POSTPROCEDURAL STATES: Chronic | ICD-10-CM

## 2019-08-21 PROCEDURE — 99024 POSTOP FOLLOW-UP VISIT: CPT

## 2019-08-21 PROCEDURE — 73502 X-RAY EXAM HIP UNI 2-3 VIEWS: CPT | Mod: 26,LT

## 2019-08-21 PROCEDURE — 73502 X-RAY EXAM HIP UNI 2-3 VIEWS: CPT

## 2019-09-02 NOTE — HISTORY OF PRESENT ILLNESS
[0] : an average pain level of 0/10 [de-identified] : 10 weeks post op from Left СЕРГЕЙ. pain free, waling without cane or device, PT done. no issues or concerns. wound is healed.

## 2019-09-02 NOTE — PHYSICAL EXAM
[LE] : Sensory: Intact in bilateral lower extremities [Normal] : Oriented to person, place, and time, insight and judgement were intact and the affect was normal [Antalgic] : not antalgic [de-identified] : LLE: skin intact, ROM 0-120 degrees of flexion. no pain . DF/PF/EHL intact 5/5. SILT L3-S1.  [de-identified] : Xrays left hip: well fixed СЕРГЕЙ components, no fractures. IMpression well aligned СЕРГЕЙ on the left.

## 2019-09-02 NOTE — ASSESSMENT
[FreeTextEntry1] : 65yo female 10 weeks post of  from L СЕРГЕЙ\par \par home exercises given\par pt doing well..\par WBAT LLE\par pain control PRN\par follow up one year from date of surgery.\par \par \par All medical record entries made by the PA/Scribe/Fellow are at my, Dr. Daniel Quezada's direction and personally dictated by me on 08/21/2019]. I have reviewed the chart and agree that the record accurately reflects my personal performance of the history, physical exam, assessment, and plan. I have also personally directed reviewed, and agreed with the chart.\par

## 2019-10-16 NOTE — PRE-OP CHECKLIST - HEIGHT IN FEET
Subjective   Elia Walsh is a 59 y.o. male.     History of Present Illness   C/o cough, nasal congestion,         {Common H&P Review Areas:61002}    Review of Systems    Objective   Physical Exam      Assessment/Plan   {Assess/PlanSmartLinks:57618}            5

## 2020-03-14 ENCOUNTER — INPATIENT (INPATIENT)
Facility: HOSPITAL | Age: 65
LOS: 3 days | Discharge: HOME | DRG: 872 | End: 2020-03-18
Attending: HOSPITALIST | Admitting: INTERNAL MEDICINE
Payer: COMMERCIAL

## 2020-03-14 VITALS
SYSTOLIC BLOOD PRESSURE: 103 MMHG | HEART RATE: 94 BPM | TEMPERATURE: 99 F | OXYGEN SATURATION: 94 % | RESPIRATION RATE: 18 BRPM | DIASTOLIC BLOOD PRESSURE: 77 MMHG | WEIGHT: 145.06 LBS

## 2020-03-14 DIAGNOSIS — R09.02 HYPOXEMIA: ICD-10-CM

## 2020-03-14 DIAGNOSIS — Z98.89 OTHER SPECIFIED POSTPROCEDURAL STATES: Chronic | ICD-10-CM

## 2020-03-14 DIAGNOSIS — R63.8 OTHER SYMPTOMS AND SIGNS CONCERNING FOOD AND FLUID INTAKE: ICD-10-CM

## 2020-03-14 DIAGNOSIS — Z98.890 OTHER SPECIFIED POSTPROCEDURAL STATES: Chronic | ICD-10-CM

## 2020-03-14 DIAGNOSIS — Z91.89 OTHER SPECIFIED PERSONAL RISK FACTORS, NOT ELSEWHERE CLASSIFIED: ICD-10-CM

## 2020-03-14 DIAGNOSIS — Z96.641 PRESENCE OF RIGHT ARTIFICIAL HIP JOINT: Chronic | ICD-10-CM

## 2020-03-14 LAB
ANION GAP SERPL CALC-SCNC: 11 MMOL/L — SIGNIFICANT CHANGE UP (ref 5–17)
BASOPHILS # BLD AUTO: 0 K/UL — SIGNIFICANT CHANGE UP (ref 0–0.2)
BASOPHILS NFR BLD AUTO: 0 % — SIGNIFICANT CHANGE UP (ref 0–2)
BUN SERPL-MCNC: 20 MG/DL — SIGNIFICANT CHANGE UP (ref 7–23)
CALCIUM SERPL-MCNC: 9.7 MG/DL — SIGNIFICANT CHANGE UP (ref 8.4–10.5)
CHLORIDE SERPL-SCNC: 99 MMOL/L — SIGNIFICANT CHANGE UP (ref 96–108)
CO2 SERPL-SCNC: 26 MMOL/L — SIGNIFICANT CHANGE UP (ref 22–31)
CREAT SERPL-MCNC: 0.97 MG/DL — SIGNIFICANT CHANGE UP (ref 0.5–1.3)
EOSINOPHIL # BLD AUTO: 0 K/UL — SIGNIFICANT CHANGE UP (ref 0–0.5)
EOSINOPHIL NFR BLD AUTO: 0 % — SIGNIFICANT CHANGE UP (ref 0–6)
GLUCOSE SERPL-MCNC: 119 MG/DL — HIGH (ref 70–99)
HCT VFR BLD CALC: 37.7 % — SIGNIFICANT CHANGE UP (ref 34.5–45)
HGB BLD-MCNC: 12.3 G/DL — SIGNIFICANT CHANGE UP (ref 11.5–15.5)
LACTATE SERPL-SCNC: 1.7 MMOL/L — SIGNIFICANT CHANGE UP (ref 0.5–2)
LYMPHOCYTES # BLD AUTO: 0.15 K/UL — LOW (ref 1–3.3)
LYMPHOCYTES # BLD AUTO: 0.9 % — LOW (ref 13–44)
MCHC RBC-ENTMCNC: 30.2 PG — SIGNIFICANT CHANGE UP (ref 27–34)
MCHC RBC-ENTMCNC: 32.6 GM/DL — SIGNIFICANT CHANGE UP (ref 32–36)
MCV RBC AUTO: 92.6 FL — SIGNIFICANT CHANGE UP (ref 80–100)
MONOCYTES # BLD AUTO: 0.15 K/UL — SIGNIFICANT CHANGE UP (ref 0–0.9)
MONOCYTES NFR BLD AUTO: 0.9 % — LOW (ref 2–14)
NEUTROPHILS # BLD AUTO: 16.65 K/UL — HIGH (ref 1.8–7.4)
NEUTROPHILS NFR BLD AUTO: 80.7 % — HIGH (ref 43–77)
PLATELET # BLD AUTO: 235 K/UL — SIGNIFICANT CHANGE UP (ref 150–400)
POTASSIUM SERPL-MCNC: 4.3 MMOL/L — SIGNIFICANT CHANGE UP (ref 3.5–5.3)
POTASSIUM SERPL-SCNC: 4.3 MMOL/L — SIGNIFICANT CHANGE UP (ref 3.5–5.3)
PROCALCITONIN SERPL-MCNC: 7.87 NG/ML — HIGH (ref 0.02–0.1)
RAPID RVP RESULT: SIGNIFICANT CHANGE UP
RBC # BLD: 4.07 M/UL — SIGNIFICANT CHANGE UP (ref 3.8–5.2)
RBC # FLD: 13.1 % — SIGNIFICANT CHANGE UP (ref 10.3–14.5)
SODIUM SERPL-SCNC: 136 MMOL/L — SIGNIFICANT CHANGE UP (ref 135–145)
WBC # BLD: 16.96 K/UL — HIGH (ref 3.8–10.5)
WBC # FLD AUTO: 16.96 K/UL — HIGH (ref 3.8–10.5)

## 2020-03-14 PROCEDURE — 99223 1ST HOSP IP/OBS HIGH 75: CPT | Mod: GC

## 2020-03-14 PROCEDURE — 99285 EMERGENCY DEPT VISIT HI MDM: CPT

## 2020-03-14 PROCEDURE — 71250 CT THORAX DX C-: CPT | Mod: 26

## 2020-03-14 PROCEDURE — 71045 X-RAY EXAM CHEST 1 VIEW: CPT | Mod: 26

## 2020-03-14 RX ORDER — CEFTRIAXONE 500 MG/1
1000 INJECTION, POWDER, FOR SOLUTION INTRAMUSCULAR; INTRAVENOUS EVERY 24 HOURS
Refills: 0 | Status: DISCONTINUED | OUTPATIENT
Start: 2020-03-15 | End: 2020-03-15

## 2020-03-14 RX ORDER — SODIUM CHLORIDE 9 MG/ML
1000 INJECTION INTRAMUSCULAR; INTRAVENOUS; SUBCUTANEOUS ONCE
Refills: 0 | Status: DISCONTINUED | OUTPATIENT
Start: 2020-03-14 | End: 2020-03-15

## 2020-03-14 RX ORDER — ALBUTEROL 90 UG/1
2.5 AEROSOL, METERED ORAL ONCE
Refills: 0 | Status: COMPLETED | OUTPATIENT
Start: 2020-03-14 | End: 2020-03-14

## 2020-03-14 RX ORDER — AZITHROMYCIN 500 MG/1
500 TABLET, FILM COATED ORAL ONCE
Refills: 0 | Status: COMPLETED | OUTPATIENT
Start: 2020-03-14 | End: 2020-03-15

## 2020-03-14 RX ORDER — CALCIUM CARBONATE 500(1250)
1 TABLET ORAL
Qty: 0 | Refills: 0 | DISCHARGE

## 2020-03-14 RX ORDER — SODIUM CHLORIDE 9 MG/ML
1000 INJECTION INTRAMUSCULAR; INTRAVENOUS; SUBCUTANEOUS ONCE
Refills: 0 | Status: COMPLETED | OUTPATIENT
Start: 2020-03-14 | End: 2020-03-14

## 2020-03-14 RX ORDER — ACETAMINOPHEN 500 MG
650 TABLET ORAL EVERY 6 HOURS
Refills: 0 | Status: DISCONTINUED | OUTPATIENT
Start: 2020-03-14 | End: 2020-03-18

## 2020-03-14 RX ORDER — CEFTRIAXONE 500 MG/1
1000 INJECTION, POWDER, FOR SOLUTION INTRAMUSCULAR; INTRAVENOUS ONCE
Refills: 0 | Status: COMPLETED | OUTPATIENT
Start: 2020-03-14 | End: 2020-03-14

## 2020-03-14 RX ORDER — L.ACIDOPH/B.ANIMALIS/B.LONGUM 15B CELL
1 CAPSULE ORAL
Qty: 0 | Refills: 0 | DISCHARGE

## 2020-03-14 RX ORDER — AZITHROMYCIN 500 MG/1
250 TABLET, FILM COATED ORAL EVERY 24 HOURS
Refills: 0 | Status: DISCONTINUED | OUTPATIENT
Start: 2020-03-15 | End: 2020-03-15

## 2020-03-14 RX ORDER — MILK THISTLE 150 MG
2 CAPSULE ORAL
Qty: 0 | Refills: 0 | DISCHARGE

## 2020-03-14 RX ORDER — KETOROLAC TROMETHAMINE 30 MG/ML
15 SYRINGE (ML) INJECTION ONCE
Refills: 0 | Status: DISCONTINUED | OUTPATIENT
Start: 2020-03-14 | End: 2020-03-14

## 2020-03-14 RX ADMIN — Medication 650 MILLIGRAM(S): at 22:07

## 2020-03-14 RX ADMIN — Medication 15 MILLIGRAM(S): at 18:10

## 2020-03-14 RX ADMIN — ALBUTEROL 2.5 MILLIGRAM(S): 90 AEROSOL, METERED ORAL at 18:45

## 2020-03-14 RX ADMIN — SODIUM CHLORIDE 1000 MILLILITER(S): 9 INJECTION INTRAMUSCULAR; INTRAVENOUS; SUBCUTANEOUS at 18:10

## 2020-03-14 RX ADMIN — Medication 650 MILLIGRAM(S): at 23:07

## 2020-03-14 RX ADMIN — Medication 15 MILLIGRAM(S): at 18:59

## 2020-03-14 RX ADMIN — CEFTRIAXONE 100 MILLIGRAM(S): 500 INJECTION, POWDER, FOR SOLUTION INTRAMUSCULAR; INTRAVENOUS at 18:10

## 2020-03-14 NOTE — H&P ADULT - NSHPLABSRESULTS_GEN_ALL_CORE
LABS:                        12.3   16.96 )-----------( 235      ( 14 Mar 2020 15:50 )             37.7     03-14    136  |  99  |  20  ----------------------------<  119<H>  4.3   |  26  |  0.97    Ca    9.7      14 Mar 2020 15:50      RADIOLOGY & ADDITIONAL TESTS:    MEDICATIONS  (STANDING):  ALBUTerol    0.083% 2.5 milliGRAM(s) Nebulizer once  sodium chloride 0.9% Bolus 1000 milliLiter(s) IV Bolus once    MEDICATIONS  (PRN):

## 2020-03-14 NOTE — ED PROVIDER NOTE - PHYSICAL EXAMINATION
CONST: nontoxic NAD speaking in full sentences  HEAD: atraumatic  EYES: conjunctivae clear  ENT: mmm, oropharynx clear, TM intact   NECK: supple/FROM, nttp, no LAD, no jvd  CARD: rrr no murmurs  CHEST: ctab no r/r/w, no stridor/retractions/tripoding  ABD: soft, nd, nttp, no rebound/guarding  EXT: FROM, symmetric distal pulses intact  SKIN: warm, dry, no rash, no pedal edema, cap refill <2sec  NEURO: a+ox3, 5/5 strength x4, gross sensation intact x4, normal gait

## 2020-03-14 NOTE — ED PROVIDER NOTE - CARE PLAN
Principal Discharge DX:	Fever  Secondary Diagnosis:	Cough Principal Discharge DX:	Sepsis  Secondary Diagnosis:	Hypoxia

## 2020-03-14 NOTE — H&P ADULT - PROBLEM SELECTOR PLAN 1
WBC elevated >16, tachy>90. Afebrile here but reported fevers at home. Unclear source at this time, RVP negative, CXR clear, CT not cw pna. S/p dose of ceftriaxone  - f/u COVID  - f/u cultures  - tylenol prn for fevers  - procal? Severe sepsis, WBC elevated >16, tachy>90, bandemia >17. Afebrile here but reported fevers at home. Unclear source at this time, likely viral although, RVP negative, CXR clear, CT not cw pna. S/p dose of ceftriaxone  - f/u COVID  - f/u cultures  - tylenol prn for fevers  - f/u procal  - f/u UA although denies urinary symptoms, is positive can send cultx Severe sepsis, WBC elevated >16, tachy>90, bandemia >17. Afebrile here but reported fevers at home. Unclear source at this time, likely viral although, RVP negative, CXR clear, CT not cw pna. S/p dose of ceftriaxone  - no abx for now  - f/u COVID  - f/u cultures  - tylenol prn for fevers  - f/u procal  - f/u UA although denies urinary symptoms, is positive can send cultx

## 2020-03-14 NOTE — H&P ADULT - PROBLEM SELECTOR PLAN 2
Arrived with 94%o2 on RA  -Getting duonebs, can be switched to MDI to decrease theoretical risk of viral spread  -o2 support prn

## 2020-03-14 NOTE — H&P ADULT - NSHPPHYSICALEXAM_GEN_ALL_CORE
Vital Signs Last 12 Hrs  T(F): 98.5 (03-14-20 @ 18:36), Max: 99.3 (03-14-20 @ 14:20)  HR: 79 (03-14-20 @ 18:36) (79 - 94)  BP: 98/47 (03-14-20 @ 18:36) (90/50 - 103/77)  BP(mean): --  RR: 18 (03-14-20 @ 18:36) (18 - 19)  SpO2: 98% (03-14-20 @ 18:36) (93% - 98%)  I&O's Summary      PHYSICAL EXAM:  Constitutional: NAD, comfortable in bed.  HEENT: NC/AT, PERRLA, EOMI, no conjunctival pallor or scleral icterus, MMM  Neck: Supple, no JVD  Respiratory: Normal rate, rhythm, depth, effort. CTAB. No w/r/r.   Cardiovascular: RRR, normal S1 and S2, no m/r/g.   Gastrointestinal: +BS, soft NTND, no guarding or rebound tenderness, no palpable masses   Extremities: wwp; no cyanosis, clubbing or edema.   Vascular: Pulses equal and strong throughout.   Neurological: AAOx3, no CN deficits, strength and sensation intact throughout.   Skin: No gross skin abnormalities or rashes Vital Signs Last 12 Hrs  T(F): 98.5 (03-14-20 @ 18:36), Max: 99.3 (03-14-20 @ 14:20)  HR: 79 (03-14-20 @ 18:36) (79 - 94)  BP: 98/47 (03-14-20 @ 18:36) (90/50 - 103/77)  BP(mean): --  RR: 18 (03-14-20 @ 18:36) (18 - 19)  SpO2: 98% (03-14-20 @ 18:36) (93% - 98%)  I&O's Summary      PHYSICAL EXAM:  Constitutional: NAD, comfortable in bed, speaking in full sentences, calm pleasant and cooperative  HEENT: NC/AT, EOMI, no conjunctival pallor or scleral icterus, MMM, ear non erythematous with no tragal or pinnus tenderness and no drainage  Neck: Supple, no JVD  Respiratory: Normal rate, rhythm, depth, effort. CTAB. No w/r/r.   Cardiovascular: RRR, normal S1 and S2, no m/r/g.   Gastrointestinal: soft NTND, no guarding or rebound tenderness, no palpable masses   Extremities: wwp; no cyanosis, clubbing or edema.   Vascular: Pulses equal and strong throughout.   Neurological: AAOx3, no CN deficits, strength and sensation intact throughout.   Skin: No gross skin abnormalities or rashes

## 2020-03-14 NOTE — ED PROVIDER NOTE - OBJECTIVE STATEMENT
64F nonsmoker, no medical problems/no Rx, referred in by pcp for rvp +/- covid testing. pt c/o initially subjective fever/chills/malaise/myalgias 7d ago w/ neg flu x2 5d ago and improving sx, now w/ 3d recurrent fever (tmax 104)/chills/malaise/myalgias and occasional dry cough. +pt was at a [employer] event just prior to onset of sx. no ha/dizziness, no neck pain/stiffness, no cp/sob, no abd pain/n/v, no diarrhea, no rash, no recent travel to [place], no known direct +COVID exposure.

## 2020-03-14 NOTE — H&P ADULT - NSICDXPASTMEDICALHX_GEN_ALL_CORE_FT
PAST MEDICAL HISTORY:  DJD (degenerative joint disease)     H/O sleep apnea uses mouth guard.    Hyperlipidemia per H&P, pt denies    OA (osteoarthritis)

## 2020-03-14 NOTE — PATIENT PROFILE ADULT - ARRIVAL FROM
Subjective:       Patient ID: David Stratton is a 37 y.o. male.    Chief Complaint: Annual Exam    Established patient for an annual wellness check/physical exam. No specific complaints, please see ROS.        Review of Systems   Constitutional: Negative for chills, fatigue and fever.   HENT: Negative for congestion and trouble swallowing.    Eyes: Negative for redness.   Respiratory: Negative for cough, chest tightness and shortness of breath.    Cardiovascular: Negative for chest pain, palpitations and leg swelling.   Gastrointestinal: Negative for abdominal pain and blood in stool.   Genitourinary: Negative for hematuria.   Musculoskeletal: Negative for arthralgias, back pain, gait problem, joint swelling, myalgias and neck pain.   Skin: Negative for color change and rash.   Neurological: Negative for tremors, speech difficulty, weakness, numbness and headaches.   Hematological: Negative for adenopathy. Does not bruise/bleed easily.   Psychiatric/Behavioral: Negative for behavioral problems, confusion and sleep disturbance. The patient is not nervous/anxious.        Objective:      Physical Exam   Constitutional: He is oriented to person, place, and time. He appears well-developed and well-nourished. No distress.   Eyes: No scleral icterus.   Neck: Normal range of motion. Neck supple. No JVD present. Carotid bruit is not present. No tracheal deviation present. No thyromegaly present.   Cardiovascular: Normal rate, regular rhythm, normal heart sounds and intact distal pulses.  Exam reveals no gallop and no friction rub.    No murmur heard.  Pulmonary/Chest: Effort normal and breath sounds normal. No respiratory distress. He has no wheezes. He has no rales.   Abdominal: Soft. Bowel sounds are normal. He exhibits no distension and no mass. There is no tenderness. There is no rebound and no guarding.   Musculoskeletal: He exhibits no edema.        Right lower leg: He exhibits no edema.        Left lower leg: He  exhibits no edema.        Legs:  Lymphadenopathy:     He has no cervical adenopathy.   Neurological: He is alert and oriented to person, place, and time. He displays no tremor. No cranial nerve deficit. Coordination and gait normal.   Skin: Skin is warm and dry. No rash noted. He is not diaphoretic. No erythema.   Psychiatric: He has a normal mood and affect. His behavior is normal. Judgment and thought content normal.   Nursing note and vitals reviewed.      Assessment:       1. Annual physical exam    2. Myalgia    3. Family history of prostate cancer        Plan:   David was seen today for annual exam.    Diagnoses and all orders for this visit:    Annual physical exam  -     Lipid panel; Future    Myalgia  Comments:  pulled muscle this summer    Family history of prostate cancer      See meds, orders, follow up, routing and instructions sections of encounter.  A 37-year-old established male patient.  He is in for an annual physical   examination.  He has no acute complaints.  He did strain a hamstring muscle this   summer playing soccer.  He has no persistent complaints with that, but he did   have an ankle injury in the past.    RECOMMENDATIONS:  Stretching quad, hamstring.  Aerobic activities discussed.  He   does have some sleep disturbance, again aerobic activity will help him with   this.  States his insurance will only pay for annual lipid panel this year and   he did know his father had prostate cancer, which we will begin screening next   year.      BETTIE/HN  dd: 11/21/2017 09:44:54 (CST)  td: 11/22/2017 05:49:54 (CST)  Doc ID   #[id number]  Job ID #[id number]    CC:        Home

## 2020-03-14 NOTE — ED ADULT NURSE REASSESSMENT NOTE - NS ED NURSE REASSESS COMMENT FT1
Pt was taken to CT and had steady gait, pending results. Pt states "I took a nap but still feel very tired".

## 2020-03-14 NOTE — H&P ADULT - NSICDXPASTSURGICALHX_GEN_ALL_CORE_FT
PAST SURGICAL HISTORY:  H/O right inguinal hernia repair     H/O:  section     History of hip replacement, total, right     History of nasal septoplasty

## 2020-03-14 NOTE — ED ADULT NURSE NOTE - OBJECTIVE STATEMENT
Pt states "I have tested negative for the flu twice, I finished the tamiflu today and I have been having fevers, chills, body aches and now a cough. I was told to come to ER by my doctor".

## 2020-03-14 NOTE — ED ADULT TRIAGE NOTE - CHIEF COMPLAINT QUOTE
sent in by Dr. Garcia w/ c/o body aches, cough x few days.  Denies known sick contacts / travels, fever, chills.  Went to [facility] at [place] and had negative RVP x 2.  Patient well-appearing, VSS, in no resp distress.  Mask applied in triage

## 2020-03-14 NOTE — H&P ADULT - ASSESSMENT
Ms. Birch is a Ms. Birch is a 64F nonsmoker, no pmh referred by pcp (Christine Garcia) for rvp +/- covid testing in setting of subjective fever/chills/malaise/myalgias 7d ago w/ neg flu x2 5d ago and improving sx, now w/ 3d recurrent fever (tmax 104)/chills/malaise/myalgias and occasional dry cough. Ms. Birch is a 64F nonsmoker, no pmh referred by pcp (Christine Garcia) for rvp +/- covid testing in setting of fevers/chills/malaise/myalgias for 1 week with occasional dry cough and rhinnorhea, on 7WO for monitoring overnight with COVID swab pending.

## 2020-03-14 NOTE — ED PROVIDER NOTE - CLINICAL SUMMARY MEDICAL DECISION MAKING FREE TEXT BOX
avss. nontoxic. NAD. no active cp. no acute resp distress. leukocytosis w/o L shift. rvp neg. cxr w/o acute focal consolidation vs ptx vs pulm edema vs enlarged cardiac silhouette. after d/w pcp, will get ct chest and send for covid testing.    dispo: pending ct chest, update dr mckinney prior to dispo afebrile. spo2 92-94%, pending neb. sbp 90-100s, pending ivf. nontoxic. NAD. no active cp. no acute resp distress. leukocytosis w/o bandemia. rvp neg. bcx/covid pending. cxr w/o acute focal consolidation vs ptx vs pulm edema vs enlarged cardiac silhouette. ct chest w/o acute abnl. after d/w drs. duroseau/gill/hank, will admit pt. afebrile. spo2 92-94%, pending neb. sbp 90-100s, pending ivf. nontoxic. NAD. no active cp. no acute resp distress. leukocytosis w/ bandemia. rvp neg. bcx/covid pending. cxr w/o acute focal consolidation vs ptx vs pulm edema vs enlarged cardiac silhouette. ct chest w/o acute abnl. after d/w drs. duroseau/gill/hank, will admit pt.

## 2020-03-14 NOTE — ED ADULT NURSE NOTE - NSIMPLEMENTINTERV_GEN_ALL_ED
Implemented All Universal Safety Interventions:  [place] to call system. Call bell, personal items and telephone within reach. Instruct patient to call for assistance. Room bathroom lighting operational. Non-slip footwear when patient is off stretcher. Physically safe environment: no spills, clutter or unnecessary equipment. Stretcher in lowest position, wheels locked, appropriate side rails in place.

## 2020-03-14 NOTE — ED PROVIDER NOTE - PROGRESS NOTE DETAILS
after d/w dr gilliam, will order cbc/bmp after d/w dr benton, will order cbc/bmp after d/w drs duroseau/gill/hank, will order ct chest noncon

## 2020-03-14 NOTE — H&P ADULT - ATTENDING COMMENTS
Agree with above with the following additions/changes:  Vitals, labs, imaging, EKG reviewed.     A/P  // Sepsis, source likely respiratory source based on symptoms. 95% on RA. +Leukocytosis with bandemia. Procalcitonin elevated to 7.9. Lactate wnl. CT chest clear. COVID-19 on DDx as is atypical and viral pna.   - supportive care  - encourage PO   - check urine Legionella Ag, LDH  - check UA to complete sepsis w/u, although without urinary symptoms  - f/u BCx x2  - Abx: ceftriaxone, azithro for now    - Rest of plan per resident note Agree with above with the following additions/changes:  Vitals, labs, imaging, EKG reviewed.     A/P  // Sepsis, source likely respiratory source based on symptoms. 95% on RA. +Leukocytosis with bandemia. Procalcitonin elevated to 7.9. Lactate wnl. CT chest clear. COVID-19 on DDx as is atypical and viral pna.   - supportive care  - encourage PO   - check urine Legionella Ag, LDH  - check UA to complete sepsis w/u, although without urinary symptoms  - f/u BCx x2  - f/u COVID PCR  - Airborne, contact precautions  - Abx: ceftriaxone, azithro for now    - Rest of plan per resident note Simple / Intermediate / Complex Repair - Final Wound Length In Cm: 4.3

## 2020-03-14 NOTE — ED ADULT NURSE NOTE - PMH
DJD (degenerative joint disease)    H/O sleep apnea  uses mouth guard.  Hyperlipidemia  per H&P, pt denies  OA (osteoarthritis)

## 2020-03-14 NOTE — H&P ADULT - PROBLEM SELECTOR PLAN 3
F:  E:  N:  ppx:  DVT:   GI: F: none, encourage PO, s/p 1L in ED  E: replete PRN  N: PO regular  ppx:  DVT: none, OOB   GI: none

## 2020-03-14 NOTE — ED PROVIDER NOTE - PSH
H/O right inguinal hernia repair    H/O:  section    History of hip replacement, total, right    History of nasal septoplasty

## 2020-03-14 NOTE — H&P ADULT - HISTORY OF PRESENT ILLNESS
Ms. Birch is a 64F nonsmoker, no medical problems/no Rx, referred in by pcp for rvp +/- covid testing. pt c/o initially subjective fever/chills/malaise/myalgias 7d ago w/ neg flu x2 5d ago and improving sx, now w/ 3d recurrent fever (tmax 104)/chills/malaise/myalgias and occasional dry cough. +pt was [place] a [employer] [employer] [place] just prior to onset of sx. no ha/dizziness, no neck pain/stiffness, no cp/sob, no abd pain/n/v, no diarrhea, no rash, no recent travel to [place], no known direct +COVID exposure      ED course: T 99.3F, HR 94, /77, 94% O2 on RA. Given 1g ceftriaxone, 15mg ketorolac, 1L NS, albuterol neb x1. CT without signs of pna, no read of GGO. CXR (not loading on pacs, ED says without focal consolidations). Blood cultures sent. WBC 16.96 with neutrophil 80.7%. RVP negative. Ms. Birch is a 64F nonsmoker, no medical problems/no Rx, referred in by pcp for rvp +/- covid testing. Pt started feeling fevers chills and myalgias 6 days ago (last sunday) at her [place] home. Took her temperature between then and tuesday, had fevers up to 104F with a forehead thermometer. Pt called her doctors and was told to go to urgent care where she tested negative for flu. Same same was retested and negative again. Providers told her it could still be false negative and sent her with tamiflu, also was given 400mg advil. Pt felt much better day after (Wednesday) but then Thursday started to feel "ill" again with fevers ~101. Started to have dry cough with lower b/l rib pains on friday worse with deep breathing. Pt also endorses night sweats since tuesday that required changing sheets and clothes ~1x per night. Endorses occasional dry cough, minor rhinorrhea. Denies chest pain (outside of lower rib pain), SOB at rest, orthopnea, edema, abd pain, dysuria, diarrhea, foul smelling urine, dizziness, palpitations. Endorses minor headaches but no neck stiffness. Denies confusion. No rash, no recent travel to [place], no known direct +COVID exposure. No sick contacts.      ED course: T 99.3F, HR 94, /77, 94% O2 on RA. Given 1g ceftriaxone, 15mg ketorolac, 1L NS, albuterol neb x1. CT without signs of pna, no read of GGO. CXR (not loading on pacs, ED says without focal consolidations). Blood cultures sent. WBC 16.96 with neutrophil 80.7%. RVP negative.

## 2020-03-15 DIAGNOSIS — R78.81 BACTEREMIA: ICD-10-CM

## 2020-03-15 LAB
-  KPC RESISTANCE GENE: SIGNIFICANT CHANGE UP
ANION GAP SERPL CALC-SCNC: 12 MMOL/L — SIGNIFICANT CHANGE UP (ref 5–17)
APPEARANCE UR: CLEAR — SIGNIFICANT CHANGE UP
BASOPHILS # BLD AUTO: 0.03 K/UL — SIGNIFICANT CHANGE UP (ref 0–0.2)
BASOPHILS NFR BLD AUTO: 0.2 % — SIGNIFICANT CHANGE UP (ref 0–2)
BILIRUB UR-MCNC: NEGATIVE — SIGNIFICANT CHANGE UP
BUN SERPL-MCNC: 22 MG/DL — SIGNIFICANT CHANGE UP (ref 7–23)
CALCIUM SERPL-MCNC: 8.8 MG/DL — SIGNIFICANT CHANGE UP (ref 8.4–10.5)
CHLORIDE SERPL-SCNC: 100 MMOL/L — SIGNIFICANT CHANGE UP (ref 96–108)
CO2 SERPL-SCNC: 24 MMOL/L — SIGNIFICANT CHANGE UP (ref 22–31)
COLOR SPEC: YELLOW — SIGNIFICANT CHANGE UP
CREAT SERPL-MCNC: 0.94 MG/DL — SIGNIFICANT CHANGE UP (ref 0.5–1.3)
DIFF PNL FLD: ABNORMAL
E COLI DNA BLD POS QL NAA+NON-PROBE: SIGNIFICANT CHANGE UP
EOSINOPHIL # BLD AUTO: 0.02 K/UL — SIGNIFICANT CHANGE UP (ref 0–0.5)
EOSINOPHIL NFR BLD AUTO: 0.2 % — SIGNIFICANT CHANGE UP (ref 0–6)
GLUCOSE SERPL-MCNC: 126 MG/DL — HIGH (ref 70–99)
GLUCOSE UR QL: NEGATIVE — SIGNIFICANT CHANGE UP
GRAM STN FLD: SIGNIFICANT CHANGE UP
HCT VFR BLD CALC: 35.1 % — SIGNIFICANT CHANGE UP (ref 34.5–45)
HGB BLD-MCNC: 11.2 G/DL — LOW (ref 11.5–15.5)
IMM GRANULOCYTES NFR BLD AUTO: 0.6 % — SIGNIFICANT CHANGE UP (ref 0–1.5)
KETONES UR-MCNC: NEGATIVE — SIGNIFICANT CHANGE UP
LDH SERPL L TO P-CCNC: 209 U/L — SIGNIFICANT CHANGE UP (ref 50–242)
LEUKOCYTE ESTERASE UR-ACNC: ABNORMAL
LYMPHOCYTES # BLD AUTO: 0.54 K/UL — LOW (ref 1–3.3)
LYMPHOCYTES # BLD AUTO: 4.3 % — LOW (ref 13–44)
MAGNESIUM SERPL-MCNC: 1.9 MG/DL — SIGNIFICANT CHANGE UP (ref 1.6–2.6)
MCHC RBC-ENTMCNC: 29.6 PG — SIGNIFICANT CHANGE UP (ref 27–34)
MCHC RBC-ENTMCNC: 31.9 GM/DL — LOW (ref 32–36)
MCV RBC AUTO: 92.9 FL — SIGNIFICANT CHANGE UP (ref 80–100)
METHOD TYPE: SIGNIFICANT CHANGE UP
MONOCYTES # BLD AUTO: 0.51 K/UL — SIGNIFICANT CHANGE UP (ref 0–0.9)
MONOCYTES NFR BLD AUTO: 4 % — SIGNIFICANT CHANGE UP (ref 2–14)
NEUTROPHILS # BLD AUTO: 11.52 K/UL — HIGH (ref 1.8–7.4)
NEUTROPHILS NFR BLD AUTO: 90.7 % — HIGH (ref 43–77)
NITRITE UR-MCNC: NEGATIVE — SIGNIFICANT CHANGE UP
NRBC # BLD: 0 /100 WBCS — SIGNIFICANT CHANGE UP (ref 0–0)
PH UR: 6 — SIGNIFICANT CHANGE UP (ref 5–8)
PLATELET # BLD AUTO: 243 K/UL — SIGNIFICANT CHANGE UP (ref 150–400)
POTASSIUM SERPL-MCNC: 4.5 MMOL/L — SIGNIFICANT CHANGE UP (ref 3.5–5.3)
POTASSIUM SERPL-SCNC: 4.5 MMOL/L — SIGNIFICANT CHANGE UP (ref 3.5–5.3)
PROT UR-MCNC: NEGATIVE MG/DL — SIGNIFICANT CHANGE UP
RBC # BLD: 3.78 M/UL — LOW (ref 3.8–5.2)
RBC # FLD: 13.2 % — SIGNIFICANT CHANGE UP (ref 10.3–14.5)
SARS-COV-2 RNA SPEC QL NAA+PROBE: SIGNIFICANT CHANGE UP
SODIUM SERPL-SCNC: 136 MMOL/L — SIGNIFICANT CHANGE UP (ref 135–145)
SP GR SPEC: <=1.005 — SIGNIFICANT CHANGE UP (ref 1–1.03)
SPECIMEN SOURCE: SIGNIFICANT CHANGE UP
SPECIMEN SOURCE: SIGNIFICANT CHANGE UP
UROBILINOGEN FLD QL: 0.2 E.U./DL — SIGNIFICANT CHANGE UP
WBC # BLD: 12.69 K/UL — HIGH (ref 3.8–10.5)
WBC # FLD AUTO: 12.69 K/UL — HIGH (ref 3.8–10.5)

## 2020-03-15 PROCEDURE — 99254 IP/OBS CNSLTJ NEW/EST MOD 60: CPT

## 2020-03-15 PROCEDURE — 99233 SBSQ HOSP IP/OBS HIGH 50: CPT | Mod: GC

## 2020-03-15 RX ORDER — IOHEXOL 300 MG/ML
30 INJECTION, SOLUTION INTRAVENOUS ONCE
Refills: 0 | Status: DISCONTINUED | OUTPATIENT
Start: 2020-03-15 | End: 2020-03-15

## 2020-03-15 RX ORDER — ASPIRIN/CALCIUM CARB/MAGNESIUM 324 MG
81 TABLET ORAL DAILY
Refills: 0 | Status: DISCONTINUED | OUTPATIENT
Start: 2020-03-15 | End: 2020-03-16

## 2020-03-15 RX ORDER — IBUPROFEN 200 MG
400 TABLET ORAL ONCE
Refills: 0 | Status: COMPLETED | OUTPATIENT
Start: 2020-03-15 | End: 2020-03-15

## 2020-03-15 RX ORDER — CEFTRIAXONE 500 MG/1
2000 INJECTION, POWDER, FOR SOLUTION INTRAMUSCULAR; INTRAVENOUS EVERY 24 HOURS
Refills: 0 | Status: DISCONTINUED | OUTPATIENT
Start: 2020-03-15 | End: 2020-03-18

## 2020-03-15 RX ORDER — CEFTRIAXONE 500 MG/1
2 INJECTION, POWDER, FOR SOLUTION INTRAMUSCULAR; INTRAVENOUS EVERY 24 HOURS
Refills: 0 | Status: DISCONTINUED | OUTPATIENT
Start: 2020-03-15 | End: 2020-03-15

## 2020-03-15 RX ADMIN — Medication 650 MILLIGRAM(S): at 21:20

## 2020-03-15 RX ADMIN — Medication 400 MILLIGRAM(S): at 05:46

## 2020-03-15 RX ADMIN — Medication 650 MILLIGRAM(S): at 20:38

## 2020-03-15 RX ADMIN — CEFTRIAXONE 100 MILLIGRAM(S): 500 INJECTION, POWDER, FOR SOLUTION INTRAMUSCULAR; INTRAVENOUS at 09:33

## 2020-03-15 RX ADMIN — Medication 81 MILLIGRAM(S): at 17:08

## 2020-03-15 RX ADMIN — Medication 400 MILLIGRAM(S): at 04:46

## 2020-03-15 RX ADMIN — AZITHROMYCIN 500 MILLIGRAM(S): 500 TABLET, FILM COATED ORAL at 00:52

## 2020-03-15 NOTE — PROGRESS NOTE ADULT - SUBJECTIVE AND OBJECTIVE BOX
OVERNIGHT EVENTS:    SUBJECTIVE / INTERVAL HPI: Patient seen and examined at bedside. No complaints, feels fatigued. Denies UTI symptoms. Mild non productive cough.     VITAL SIGNS:  Vital Signs Last 24 Hrs  T(C): 36.5 (15 Mar 2020 09:59), Max: 37.6 (15 Mar 2020 05:14)  T(F): 97.7 (15 Mar 2020 09:59), Max: 99.7 (15 Mar 2020 05:14)  HR: 68 (15 Mar 2020 09:59) (68 - 94)  BP: 86/53 (15 Mar 2020 09:59) (86/53 - 104/63)  BP(mean): --  RR: 16 (15 Mar 2020 09:59) (16 - 19)  SpO2: 94% (15 Mar 2020 09:59) (93% - 98%)    PHYSICAL EXAM:    General: WDWN  HEENT: NC/AT; PERRL, anicteric sclera  Neck: supple  Cardiovascular: +S1/S2; RRR  Respiratory: CTA b/l; no W/R/R  Gastrointestinal: soft, NT/ND; +BSx4, no CVA tenderness  Extremities: WWP; no edema, clubbing or cyanosis  Vascular: 2+ radial, DP/PT pulses b/l  Neurological: AAOx3; no focal deficits    MEDICATIONS:  MEDICATIONS  (STANDING):  azithromycin   Tablet 250 milliGRAM(s) Oral every 24 hours  cefTRIAXone   IVPB 2000 milliGRAM(s) IV Intermittent every 24 hours  sodium chloride 0.9% Bolus 1000 milliLiter(s) IV Bolus once    MEDICATIONS  (PRN):  acetaminophen   Tablet .. 650 milliGRAM(s) Oral every 6 hours PRN Temp greater or equal to 38C (100.4F), Moderate Pain (4 - 6), Severe Pain (7 - 10)      ALLERGIES:  Allergies    No Known Allergies    Intolerances        LABS:                        11.2   12.69 )-----------( 243      ( 15 Mar 2020 07:00 )             35.1     03-15    136  |  100  |  22  ----------------------------<  126<H>  4.5   |  24  |  0.94    Ca    8.8      15 Mar 2020 07:00  Mg     1.9     03-15        Urinalysis Basic - ( 15 Mar 2020 08:48 )    Color: Yellow / Appearance: Clear / SG: <=1.005 / pH: x  Gluc: x / Ketone: NEGATIVE  / Bili: Negative / Urobili: 0.2 E.U./dL   Blood: x / Protein: NEGATIVE mg/dL / Nitrite: NEGATIVE   Leuk Esterase: Large / RBC: 5-10 /HPF / WBC Many /HPF   Sq Epi: x / Non Sq Epi: 0-5 /HPF / Bacteria: Present /HPF      CAPILLARY BLOOD GLUCOSE          RADIOLOGY & ADDITIONAL TESTS: Reviewed.    ASSESSMENT:    PLAN:

## 2020-03-15 NOTE — CONSULT NOTE ADULT - SUBJECTIVE AND OBJECTIVE BOX
HPI:  65 yo F with B THR with resp symptoms and gram negative bacteremia.  ID consult for assistance with w/u and management.  She was in [facility] until 3/6 when she noted onset of myalgias and malaise.  On 3/10, she began having intermittent fevers, Tm 104, with night sweats, nonproductive cough and pleuritic CP (base of ant ribs).  She went to urgent care where she was given Advil and started on oseltamavir despite neg flu test.  She initially felt better but then again worsened by 3/12.  On 3/14, she began having severe rigors.  She came to the ED where she had T 99.4, HR 94, /77, O2sat 94%, WBC 16.96 CXR showed no acute infiltrates, CT chest did not show pneumonia. She was admitted to evaluate for COVID-19 and started on ceftriaxone/azithro. This morning, her blood cultures from yesterday grew gram negative rods, now IDed as E coli.  She has had HA, no photophobia, neck stiffness, rhinorrhea, sore throat.  Has mild SOB.  No N, V.  She had one loose BM today, otherwise no diarrhea abd pain, dysuria, hematuria, back/flank pain.  She has had ~6 UTIs in the past, not for a long time, no recent antibiotic use.        PAST MEDICAL & SURGICAL HISTORY:  H/O sleep apnea: uses mouth guard.  OA (osteoarthritis)  DJD (degenerative joint disease)  History of nasal septoplasty  History of hip replacement, total, right  H/O right inguinal hernia repair  H/O:  section          MEDICATIONS  (STANDING):  azithromycin   Tablet 250 milliGRAM(s) Oral every 24 hours  cefTRIAXone   IVPB 2000 milliGRAM(s) IV Intermittent every 24 hours  sodium chloride 0.9% Bolus 1000 milliLiter(s) IV Bolus once    MEDICATIONS  (PRN):  acetaminophen   Tablet .. 650 milliGRAM(s) Oral every 6 hours PRN Temp greater or equal to 38C (100.4F), Moderate Pain (4 - 6), Severe Pain (7 - 10)      Allergies    No Known Allergies    Intolerances        SOCIAL HISTORY:  , lives in [place] with her .  4 adult children.  Retired .  No pets. Recent travel - [place] in early March, spent month in [place] ~February.  Previous travel to [place] in [place].    FAMILY HISTORY:  No pertinent family history in first degree relatives.      Vital Signs Last 24 Hrs  T(C): 36.5 (15 Mar 2020 09:59), Max: 37.6 (15 Mar 2020 05:14)  T(F): 97.7 (15 Mar 2020 09:59), Max: 99.7 (15 Mar 2020 05:14)  HR: 68 (15 Mar 2020 09:59) (68 - 82)  BP: 86/53 (15 Mar 2020 09:59) (86/53 - 104/63)  BP(mean): --  RR: 16 (15 Mar 2020 09:59) (16 - 19)  SpO2: 94% (15 Mar 2020 09:59) (93% - 98%)    PE:  WDWN in no distress  HEENT:  NC, PERRL, sclerae anicteric, conjunctivae clear, EOMI.  Sinuses nontender, no nasal exudate.  No buccal or pharyngeal lesions, erythema or exudate  Neck:  Supple, no adenopathy  Lungs:  Clear to auscultation  Cor:  RRR, S1, S2, no murmur appreciated  Abd:  Symmetric, normoactive BS.  Soft, nontender, no masses, guarding or rebound.  Liver and spleen not enlarged.  No flank/CVA tenderness  Extrem:  No cyanosis or edema. FROM of hips  Skin:  No rashes.    LABS:                        11.2   12.69 )-----------( 243      ( 15 Mar 2020 07:00 )             35.1     03-15    136  |  100  |  22  ----------------------------<  126<H>  4.5   |  24  |  0.94    Ca    8.8      15 Mar 2020 07:00  Mg     1.9     03-15      Urinalysis Basic - ( 15 Mar 2020 08:48 )    Color: Yellow / Appearance: Clear / SG: <=1.005 / pH: x  Gluc: x / Ketone: NEGATIVE  / Bili: Negative / Urobili: 0.2 E.U./dL   Blood: x / Protein: NEGATIVE mg/dL / Nitrite: NEGATIVE   Leuk Esterase: Large / RBC: 5-10 /HPF / WBC Many /HPF   Sq Epi: x / Non Sq Epi: 0-5 /HPF / Bacteria: Present /HPF      MICROBIOLOGY:  Blood cultures 3/14 X 2:  GNRs (3/4 bottles) - BCID - E coli    RADIOLOGY & ADDITIONAL STUDIES:  < from: Xray Chest 1 View- PORTABLE-Urgent (20 @ 15:00) >  Frontal and lateral examination of the chest demonstrates the heart to be within normal limits in transverse diameter. No acute infiltrates. Old left rib fracture. Calcification aortic knob.    Impression: No acute infiltrates      < end of copied text >    < from: CT Chest No Cont (20 @ 17:28) >  Findings:     Lungs and large airways: No consolidation. Bibasilar atelectases. 7 mm benign calcified nodule along the right major fissure.     Pleura:  No pleural effusion.    Mediastinum and hilar regions: No thoracic lymphadenopathy.    Heart and pericardium:  Heart size is normal. No pericardial effusion.    Vessels:  Normal.    Chest wall and lower neck:  Subcentimeter left thyroid nodule    Upper abdomen: Normal.    Bones: No significant abnormality.      Impression: No pneumonia.    < end of copied text >

## 2020-03-15 NOTE — CONSULT NOTE ADULT - ASSESSMENT
63 yo F with E coli bacteremia without GI or urinary symptoms, though with pyuria on UA.  All of her symptomatology is c/c viral etiology - myalgias, cough, pleuritic CP and HA.  She describes severe rigors yesterday, c/c bacteremia.  Agree with plan to evaluate for COVID-19 but also need to identify source for bacteremia.   She will need CT A/P w/ po and w/wo IV contrast but does not need to be done emergently in view of isolation precautions.  She appears to be responding well to ceftriaxone with decreasing WBC and she is not toxic appearing.  She has significant travel to [place] - would evaluate for Strongyloides in setting of E coli bacteremia.  Suggest:  - F/U blood culture ID and susceptibility, urine culture  - Repeat surveillance culture  - F/U COVID-19 PCR - pending  - Strongyloides IgG  - Plan for CT when result of PCR is known - would do immediately if she decompensates clinically  - Continue ceftriaxone 2 g IV q24h - if she decompensates at all, would have very low threshold for switching to pip-tazo  - D/C azithromycin  Recommendations discussed with primary team.  Will follow with you - team 1.

## 2020-03-15 NOTE — PROGRESS NOTE ADULT - ASSESSMENT
Ms. Birch is a 64F nonsmoker, no pmh referred by pcp (Christine Garcia) for rvp +/- covid testing in setting of fevers/chills/malaise/myalgias for 1 week with occasional dry cough and rhinnorhea, on 7WO for monitoring overnight with COVID swab pending.

## 2020-03-16 DIAGNOSIS — R78.81 BACTEREMIA: ICD-10-CM

## 2020-03-16 LAB
ANION GAP SERPL CALC-SCNC: 9 MMOL/L — SIGNIFICANT CHANGE UP (ref 5–17)
BUN SERPL-MCNC: 11 MG/DL — SIGNIFICANT CHANGE UP (ref 7–23)
CALCIUM SERPL-MCNC: 9.5 MG/DL — SIGNIFICANT CHANGE UP (ref 8.4–10.5)
CHLORIDE SERPL-SCNC: 105 MMOL/L — SIGNIFICANT CHANGE UP (ref 96–108)
CO2 SERPL-SCNC: 26 MMOL/L — SIGNIFICANT CHANGE UP (ref 22–31)
CREAT SERPL-MCNC: 0.68 MG/DL — SIGNIFICANT CHANGE UP (ref 0.5–1.3)
CULTURE RESULTS: NO GROWTH — SIGNIFICANT CHANGE UP
GLUCOSE SERPL-MCNC: 126 MG/DL — HIGH (ref 70–99)
HCT VFR BLD CALC: 38.2 % — SIGNIFICANT CHANGE UP (ref 34.5–45)
HGB BLD-MCNC: 12.6 G/DL — SIGNIFICANT CHANGE UP (ref 11.5–15.5)
MAGNESIUM SERPL-MCNC: 2.1 MG/DL — SIGNIFICANT CHANGE UP (ref 1.6–2.6)
MCHC RBC-ENTMCNC: 30.3 PG — SIGNIFICANT CHANGE UP (ref 27–34)
MCHC RBC-ENTMCNC: 33 GM/DL — SIGNIFICANT CHANGE UP (ref 32–36)
MCV RBC AUTO: 91.8 FL — SIGNIFICANT CHANGE UP (ref 80–100)
NRBC # BLD: 0 /100 WBCS — SIGNIFICANT CHANGE UP (ref 0–0)
PLATELET # BLD AUTO: 265 K/UL — SIGNIFICANT CHANGE UP (ref 150–400)
POTASSIUM SERPL-MCNC: 4.8 MMOL/L — SIGNIFICANT CHANGE UP (ref 3.5–5.3)
POTASSIUM SERPL-SCNC: 4.8 MMOL/L — SIGNIFICANT CHANGE UP (ref 3.5–5.3)
RBC # BLD: 4.16 M/UL — SIGNIFICANT CHANGE UP (ref 3.8–5.2)
RBC # FLD: 13.1 % — SIGNIFICANT CHANGE UP (ref 10.3–14.5)
SODIUM SERPL-SCNC: 140 MMOL/L — SIGNIFICANT CHANGE UP (ref 135–145)
SPECIMEN SOURCE: SIGNIFICANT CHANGE UP
WBC # BLD: 6.11 K/UL — SIGNIFICANT CHANGE UP (ref 3.8–10.5)
WBC # FLD AUTO: 6.11 K/UL — SIGNIFICANT CHANGE UP (ref 3.8–10.5)

## 2020-03-16 PROCEDURE — 99232 SBSQ HOSP IP/OBS MODERATE 35: CPT | Mod: GC

## 2020-03-16 PROCEDURE — 99233 SBSQ HOSP IP/OBS HIGH 50: CPT | Mod: GC

## 2020-03-16 PROCEDURE — 74177 CT ABD & PELVIS W/CONTRAST: CPT | Mod: 26

## 2020-03-16 PROCEDURE — 74183 MRI ABD W/O CNTR FLWD CNTR: CPT | Mod: 26

## 2020-03-16 RX ORDER — ASPIRIN/CALCIUM CARB/MAGNESIUM 324 MG
81 TABLET ORAL ONCE
Refills: 0 | Status: COMPLETED | OUTPATIENT
Start: 2020-03-16 | End: 2020-03-16

## 2020-03-16 RX ORDER — ASPIRIN/CALCIUM CARB/MAGNESIUM 324 MG
81 TABLET ORAL EVERY 24 HOURS
Refills: 0 | Status: DISCONTINUED | OUTPATIENT
Start: 2020-03-16 | End: 2020-03-18

## 2020-03-16 RX ORDER — IOHEXOL 300 MG/ML
30 INJECTION, SOLUTION INTRAVENOUS ONCE
Refills: 0 | Status: COMPLETED | OUTPATIENT
Start: 2020-03-16 | End: 2020-03-16

## 2020-03-16 RX ORDER — KETOROLAC TROMETHAMINE 30 MG/ML
10 SYRINGE (ML) INJECTION ONCE
Refills: 0 | Status: DISCONTINUED | OUTPATIENT
Start: 2020-03-16 | End: 2020-03-16

## 2020-03-16 RX ADMIN — Medication 10 MILLIGRAM(S): at 17:50

## 2020-03-16 RX ADMIN — IOHEXOL 30 MILLILITER(S): 300 INJECTION, SOLUTION INTRAVENOUS at 11:11

## 2020-03-16 RX ADMIN — Medication 10 MILLIGRAM(S): at 18:52

## 2020-03-16 RX ADMIN — CEFTRIAXONE 100 MILLIGRAM(S): 500 INJECTION, POWDER, FOR SOLUTION INTRAMUSCULAR; INTRAVENOUS at 07:25

## 2020-03-16 RX ADMIN — Medication 81 MILLIGRAM(S): at 00:19

## 2020-03-16 RX ADMIN — Medication 81 MILLIGRAM(S): at 10:37

## 2020-03-16 NOTE — PROGRESS NOTE ADULT - ASSESSMENT
Ms. Birch is a 64F nonsmoker, no pmh referred by pcp (Christine Garcia in setting of fevers/chills/malaise/myalgias for 1 week with occasional dry cough and rhinnorhea, body aches, but COVID negative, was found to have E.coli bacteremia with unknown source.

## 2020-03-16 NOTE — PROGRESS NOTE ADULT - ASSESSMENT
65 yo F with E coli bacteremia without GI or urinary symptoms, though with pyuria on UA.  All of her symptomatology is c/c viral etiology - myalgias, cough, pleuritic CP and HA.  She describes severe rigors yesterday, c/c bacteremia. COVID 19 negative.  Now  CT A/P w/ po and w/wo IV contrast with nonspecific splenic and  She appears to be responding well to ceftriaxone with decreasing WBC and she is not toxic appearing.     Suggest:  - F/U blood culture E coli  and susceptibility, urine culture  - F/u surveillance cx   - Strongyloides IgG (given hx of [place] travel and EC BSI)   - Continue ceftriaxone 2 g IV q24h - if she decompensates at all, would have very low threshold for switching to pip-tazo  -For better characterization: MRI abdomen  with and without contrast to asses splenic lesion and left kidney   -Recommendations discussed with primary team.  Will follow with you - team 1.

## 2020-03-16 NOTE — PROGRESS NOTE ADULT - PROBLEM SELECTOR PLAN 1
Patient presented with severe sepsis. RVP and COVID negative. UA was positive but urine culture negative. CT without any evidence of PNA.  - Blood Cx + for E.Coli   - f/u surveillance cx   - C/w with Ceftriaxone 2 gram daily   - F/u ID recs   - Patient need CT Abdomen/Pelvis to look for another source

## 2020-03-16 NOTE — PROGRESS NOTE ADULT - SUBJECTIVE AND OBJECTIVE BOX
SUBJECTIVE / INTERVAL HPI: Patient seen and examined at bedside. Patient was complaining of headache this morning, and persistent cough. Patient also feels still weak, but denies any urinary complaints, nausea/vomiting, chest pain, or SOB.     VITAL SIGNS:  Vital Signs Last 24 Hrs  T(C): 36.8 (16 Mar 2020 05:45), Max: 37.4 (15 Mar 2020 20:27)  T(F): 98.2 (16 Mar 2020 05:45), Max: 99.3 (15 Mar 2020 20:27)  HR: 58 (16 Mar 2020 05:45) (58 - 79)  BP: 104/60 (16 Mar 2020 05:45) (86/53 - 104/60)  BP(mean): --  RR: 16 (16 Mar 2020 05:45) (16 - 16)  SpO2: 94% (16 Mar 2020 05:45) (94% - 95%)    REVIEW OF SYSTEMS:  All other review of systems is negative unless indicated above.    PHYSICAL EXAM:  General: WDWN  HEENT: NC/AT; PERRL, clear conjunctiva  Neck: supple, no JVD,   Cardiovascular: +S1/S2; RRR, no M/R/G  Respiratory: CTA b/l; no W/R/R  Gastrointestinal: soft, NT/ND; +BSx4  Extremities: WWP; 2+ peripheral pulses; no edema   Neurological: AAOx3; no focal deficits    MEDICATIONS:  MEDICATIONS  (STANDING):  aspirin enteric coated 81 milliGRAM(s) Oral every 24 hours  cefTRIAXone   IVPB 2000 milliGRAM(s) IV Intermittent every 24 hours    MEDICATIONS  (PRN):  acetaminophen   Tablet .. 650 milliGRAM(s) Oral every 6 hours PRN Temp greater or equal to 38C (100.4F), Moderate Pain (4 - 6), Severe Pain (7 - 10)  benzonatate 100 milliGRAM(s) Oral three times a day PRN Cough      ALLERGIES:  Allergies    No Known Allergies    Intolerances        LABS:                        12.6   6.11  )-----------( 265      ( 16 Mar 2020 05:46 )             38.2     03-16    140  |  105  |  11  ----------------------------<  126<H>  4.8   |  26  |  0.68    Ca    9.5      16 Mar 2020 05:46  Mg     2.1     03-16        Urinalysis Basic - ( 15 Mar 2020 08:48 )    Color: Yellow / Appearance: Clear / SG: <=1.005 / pH: x  Gluc: x / Ketone: NEGATIVE  / Bili: Negative / Urobili: 0.2 E.U./dL   Blood: x / Protein: NEGATIVE mg/dL / Nitrite: NEGATIVE   Leuk Esterase: Large / RBC: 5-10 /HPF / WBC Many /HPF   Sq Epi: x / Non Sq Epi: 0-5 /HPF / Bacteria: Present /HPF      CAPILLARY BLOOD GLUCOSE          RADIOLOGY & ADDITIONAL TESTS: Reviewed.

## 2020-03-17 ENCOUNTER — TRANSCRIPTION ENCOUNTER (OUTPATIENT)
Age: 65
End: 2020-03-17

## 2020-03-17 LAB
-  AMPICILLIN/SULBACTAM: SIGNIFICANT CHANGE UP
-  AMPICILLIN/SULBACTAM: SIGNIFICANT CHANGE UP
-  AMPICILLIN: SIGNIFICANT CHANGE UP
-  AMPICILLIN: SIGNIFICANT CHANGE UP
-  CEFAZOLIN: SIGNIFICANT CHANGE UP
-  CEFAZOLIN: SIGNIFICANT CHANGE UP
-  CEFTRIAXONE: SIGNIFICANT CHANGE UP
-  CEFTRIAXONE: SIGNIFICANT CHANGE UP
-  CIPROFLOXACIN: SIGNIFICANT CHANGE UP
-  GENTAMICIN: SIGNIFICANT CHANGE UP
-  GENTAMICIN: SIGNIFICANT CHANGE UP
-  PIPERACILLIN/TAZOBACTAM: SIGNIFICANT CHANGE UP
-  PIPERACILLIN/TAZOBACTAM: SIGNIFICANT CHANGE UP
-  TOBRAMYCIN: SIGNIFICANT CHANGE UP
-  TOBRAMYCIN: SIGNIFICANT CHANGE UP
-  TRIMETHOPRIM/SULFAMETHOXAZOLE: SIGNIFICANT CHANGE UP
-  TRIMETHOPRIM/SULFAMETHOXAZOLE: SIGNIFICANT CHANGE UP
ALBUMIN SERPL ELPH-MCNC: 3.7 G/DL — SIGNIFICANT CHANGE UP (ref 3.3–5)
ALP SERPL-CCNC: 124 U/L — HIGH (ref 40–120)
ALT FLD-CCNC: 44 U/L — SIGNIFICANT CHANGE UP (ref 10–45)
ANION GAP SERPL CALC-SCNC: 10 MMOL/L — SIGNIFICANT CHANGE UP (ref 5–17)
ANION GAP SERPL CALC-SCNC: 11 MMOL/L — SIGNIFICANT CHANGE UP (ref 5–17)
AST SERPL-CCNC: 19 U/L — SIGNIFICANT CHANGE UP (ref 10–40)
BASOPHILS # BLD AUTO: 0.03 K/UL — SIGNIFICANT CHANGE UP (ref 0–0.2)
BASOPHILS NFR BLD AUTO: 0.4 % — SIGNIFICANT CHANGE UP (ref 0–2)
BILIRUB SERPL-MCNC: <0.2 MG/DL — SIGNIFICANT CHANGE UP (ref 0.2–1.2)
BUN SERPL-MCNC: 13 MG/DL — SIGNIFICANT CHANGE UP (ref 7–23)
BUN SERPL-MCNC: 15 MG/DL — SIGNIFICANT CHANGE UP (ref 7–23)
CALCIUM SERPL-MCNC: 9.6 MG/DL — SIGNIFICANT CHANGE UP (ref 8.4–10.5)
CALCIUM SERPL-MCNC: 9.9 MG/DL — SIGNIFICANT CHANGE UP (ref 8.4–10.5)
CHLORIDE SERPL-SCNC: 102 MMOL/L — SIGNIFICANT CHANGE UP (ref 96–108)
CHLORIDE SERPL-SCNC: 102 MMOL/L — SIGNIFICANT CHANGE UP (ref 96–108)
CO2 SERPL-SCNC: 24 MMOL/L — SIGNIFICANT CHANGE UP (ref 22–31)
CO2 SERPL-SCNC: 28 MMOL/L — SIGNIFICANT CHANGE UP (ref 22–31)
CREAT SERPL-MCNC: 0.73 MG/DL — SIGNIFICANT CHANGE UP (ref 0.5–1.3)
CREAT SERPL-MCNC: 0.86 MG/DL — SIGNIFICANT CHANGE UP (ref 0.5–1.3)
CULTURE RESULTS: SIGNIFICANT CHANGE UP
CULTURE RESULTS: SIGNIFICANT CHANGE UP
EOSINOPHIL # BLD AUTO: 0.15 K/UL — SIGNIFICANT CHANGE UP (ref 0–0.5)
EOSINOPHIL NFR BLD AUTO: 2.2 % — SIGNIFICANT CHANGE UP (ref 0–6)
GLUCOSE SERPL-MCNC: 106 MG/DL — HIGH (ref 70–99)
GLUCOSE SERPL-MCNC: 118 MG/DL — HIGH (ref 70–99)
HCT VFR BLD CALC: 39.9 % — SIGNIFICANT CHANGE UP (ref 34.5–45)
HGB BLD-MCNC: 12.8 G/DL — SIGNIFICANT CHANGE UP (ref 11.5–15.5)
IMM GRANULOCYTES NFR BLD AUTO: 0.6 % — SIGNIFICANT CHANGE UP (ref 0–1.5)
LYMPHOCYTES # BLD AUTO: 1.05 K/UL — SIGNIFICANT CHANGE UP (ref 1–3.3)
LYMPHOCYTES # BLD AUTO: 15.1 % — SIGNIFICANT CHANGE UP (ref 13–44)
MAGNESIUM SERPL-MCNC: 2.1 MG/DL — SIGNIFICANT CHANGE UP (ref 1.6–2.6)
MCHC RBC-ENTMCNC: 29.8 PG — SIGNIFICANT CHANGE UP (ref 27–34)
MCHC RBC-ENTMCNC: 32.1 GM/DL — SIGNIFICANT CHANGE UP (ref 32–36)
MCV RBC AUTO: 92.8 FL — SIGNIFICANT CHANGE UP (ref 80–100)
METHOD TYPE: SIGNIFICANT CHANGE UP
MONOCYTES # BLD AUTO: 0.53 K/UL — SIGNIFICANT CHANGE UP (ref 0–0.9)
MONOCYTES NFR BLD AUTO: 7.6 % — SIGNIFICANT CHANGE UP (ref 2–14)
NEUTROPHILS # BLD AUTO: 5.16 K/UL — SIGNIFICANT CHANGE UP (ref 1.8–7.4)
NEUTROPHILS NFR BLD AUTO: 74.1 % — SIGNIFICANT CHANGE UP (ref 43–77)
NRBC # BLD: 0 /100 WBCS — SIGNIFICANT CHANGE UP (ref 0–0)
ORGANISM # SPEC MICROSCOPIC CNT: SIGNIFICANT CHANGE UP
PLATELET # BLD AUTO: 314 K/UL — SIGNIFICANT CHANGE UP (ref 150–400)
POTASSIUM SERPL-MCNC: 4.2 MMOL/L — SIGNIFICANT CHANGE UP (ref 3.5–5.3)
POTASSIUM SERPL-MCNC: 6.1 MMOL/L — HIGH (ref 3.5–5.3)
POTASSIUM SERPL-SCNC: 4.2 MMOL/L — SIGNIFICANT CHANGE UP (ref 3.5–5.3)
POTASSIUM SERPL-SCNC: 6.1 MMOL/L — HIGH (ref 3.5–5.3)
PROT SERPL-MCNC: 7.2 G/DL — SIGNIFICANT CHANGE UP (ref 6–8.3)
RBC # BLD: 4.3 M/UL — SIGNIFICANT CHANGE UP (ref 3.8–5.2)
RBC # FLD: 12.9 % — SIGNIFICANT CHANGE UP (ref 10.3–14.5)
SODIUM SERPL-SCNC: 137 MMOL/L — SIGNIFICANT CHANGE UP (ref 135–145)
SODIUM SERPL-SCNC: 140 MMOL/L — SIGNIFICANT CHANGE UP (ref 135–145)
SPECIMEN SOURCE: SIGNIFICANT CHANGE UP
SPECIMEN SOURCE: SIGNIFICANT CHANGE UP
WBC # BLD: 6.96 K/UL — SIGNIFICANT CHANGE UP (ref 3.8–10.5)
WBC # FLD AUTO: 6.96 K/UL — SIGNIFICANT CHANGE UP (ref 3.8–10.5)

## 2020-03-17 PROCEDURE — 99232 SBSQ HOSP IP/OBS MODERATE 35: CPT | Mod: GC

## 2020-03-17 PROCEDURE — 99233 SBSQ HOSP IP/OBS HIGH 50: CPT | Mod: GC

## 2020-03-17 RX ORDER — SODIUM ZIRCONIUM CYCLOSILICATE 10 G/10G
5 POWDER, FOR SUSPENSION ORAL ONCE
Refills: 0 | Status: COMPLETED | OUTPATIENT
Start: 2020-03-17 | End: 2020-03-17

## 2020-03-17 RX ORDER — GUAIFENESIN/DEXTROMETHORPHAN 600MG-30MG
10 TABLET, EXTENDED RELEASE 12 HR ORAL THREE TIMES A DAY
Refills: 0 | Status: DISCONTINUED | OUTPATIENT
Start: 2020-03-17 | End: 2020-03-18

## 2020-03-17 RX ORDER — SODIUM POLYSTYRENE SULFONATE 4.1 MEQ/G
15 POWDER, FOR SUSPENSION ORAL ONCE
Refills: 0 | Status: DISCONTINUED | OUTPATIENT
Start: 2020-03-17 | End: 2020-03-17

## 2020-03-17 RX ORDER — KETOROLAC TROMETHAMINE 30 MG/ML
10 SYRINGE (ML) INJECTION ONCE
Refills: 0 | Status: DISCONTINUED | OUTPATIENT
Start: 2020-03-17 | End: 2020-03-17

## 2020-03-17 RX ADMIN — CEFTRIAXONE 100 MILLIGRAM(S): 500 INJECTION, POWDER, FOR SOLUTION INTRAMUSCULAR; INTRAVENOUS at 05:42

## 2020-03-17 RX ADMIN — Medication 81 MILLIGRAM(S): at 05:36

## 2020-03-17 RX ADMIN — SODIUM ZIRCONIUM CYCLOSILICATE 5 GRAM(S): 10 POWDER, FOR SUSPENSION ORAL at 11:23

## 2020-03-17 RX ADMIN — Medication 10 MILLILITER(S): at 21:41

## 2020-03-17 RX ADMIN — Medication 10 MILLILITER(S): at 14:39

## 2020-03-17 RX ADMIN — Medication 10 MILLIGRAM(S): at 18:47

## 2020-03-17 NOTE — DISCHARGE NOTE PROVIDER - NSDCCPCAREPLAN_GEN_ALL_CORE_FT
PRINCIPAL DISCHARGE DIAGNOSIS  Diagnosis: E coli bacteremia  Assessment and Plan of Treatment: Urinary tract infections, also called "UTIs," are infections that affect either the bladder or the kidneys. Bladder infections happen when bacteria get into the urethra and travel up into the bladder. Kidney infections happen when the bacteria travel even higher, up into the kidneys. Signs that an infection has spread to the kidneys include fever, back pain, or nausea/vomiting. It is important that you take your antibiotics as prescribed and to completion to properly treat your urinary tract infection and prevent antibiotic resistance. Your infection caused irregular symptoms such as fevers, body aches. Your infection traveled to your blood stream. You were started on Ceftriaxone (an antibiotic) and clinically improved. You will need to have this medication IV for a total of 2 weeks.  You had a CT done that confirmed the infection was in your kidneys. PRINCIPAL DISCHARGE DIAGNOSIS  Diagnosis: E coli bacteremia  Assessment and Plan of Treatment: Urinary tract infections, also called "UTIs," are infections that affect either the bladder or the kidneys. Bladder infections happen when bacteria get into the urethra and travel up into the bladder. Kidney infections happen when the bacteria travel even higher, up into the kidneys. Signs that an infection has spread to the kidneys include fever, back pain, or nausea/vomiting. It is important that you take your antibiotics as prescribed and to completion to properly treat your urinary tract infection and prevent antibiotic resistance. Your infection caused irregular symptoms such as fevers, body aches. Your infection traveled to your blood stream. You were started on Ceftriaxone (an antibiotic) and clinically improved. You will need to have this medication IV for a total of 2 weeks (to end 3/30).  You had a CT done that confirmed the infection was in your kidneys.         SECONDARY DISCHARGE DIAGNOSES  Diagnosis: Coronavirus infection  Assessment and Plan of Treatment: You were negative for COVID once but has a persistent dry cough so a second test was sent out and the results are currently pending. We will call you with the results. PRINCIPAL DISCHARGE DIAGNOSIS  Diagnosis: E coli bacteremia  Assessment and Plan of Treatment: Urinary tract infections, also called "UTIs," are infections that affect either the bladder or the kidneys. Bladder infections happen when bacteria get into the urethra and travel up into the bladder. Kidney infections happen when the bacteria travel even higher, up into the kidneys. Signs that an infection has spread to the kidneys include fever, back pain, or nausea/vomiting. It is important that you take your antibiotics as prescribed and to completion to properly treat your urinary tract infection and prevent antibiotic resistance. Your infection caused irregular symptoms such as fevers, body aches. Your infection traveled to your blood stream. You were started on Ceftriaxone (an antibiotic) and clinically improved. You will need to have this medication IV for a total of 2 weeks (to end 3/30).  You had a CT done that confirmed the infection was in your kidneys.         SECONDARY DISCHARGE DIAGNOSES  Diagnosis: Coronavirus infection  Assessment and Plan of Treatment: You were negative for COVID once but has a persistent dry cough so a second test was sent out and the results are currently pending. We will call you with the results.  You will be provided with a sheet of guidelines regarding isolation precautions that you should follow until your result is called in to you. You should remain at home and leave only if medically necessary for the time being. More extensive instructions can be found on the [facility] COVID instructions that you will be given at discharge.  Please continue washing your hands regularly, cover your mouth for now, stay away from public spaces, do not take public transport, and do not use any ride-share services.

## 2020-03-17 NOTE — PROGRESS NOTE ADULT - PROBLEM SELECTOR PLAN 1
Patient presented with severe sepsis. RVP and COVID negative. UA was positive but urine culture negative. CT without any evidence of PNA. CT abdomen shows likely left upper pyelonephritis, and possible splenic hemangiomas.   - Blood Cx + for E.Coli   - surveillance cx shows NGTD   - C/w with Ceftriaxone 2 gram daily   - f/u urine culture sensitivities   - F/u ID recs   - CT Abdomen showing left upper pyelonephritis, with possible splenic hemangiomas   - F/u MR Abdomen

## 2020-03-17 NOTE — DISCHARGE NOTE PROVIDER - CARE PROVIDER_API CALL
Dr. Lance Dewitt  [address] [place] [id number]  Phone: [phone]  Fax: (   )    -  Follow Up Time: 1 week

## 2020-03-17 NOTE — PROGRESS NOTE ADULT - ASSESSMENT
63 yo F with E coli bacteremia without GI or urinary symptoms, though with pyuria on UA.  All of her symptomatology is c/c viral etiology - myalgias, cough, pleuritic CP and HA.  She describes severe rigors yesterday, c/c bacteremia. COVID 19 negative.  MRI with left sided pyelonephritis, blood cx ecoli sn to ceftriaxone    Suggest:  -MRI w/ left sided pyelonephritis  -bld cx w/ e. coli sn to ceftriaxone, needs total 14 d therapy until 3-30 (14 day duration)  -midline in place  -weekly cbc, cmp f/u with Dr. Reis outpatient    ID will sign off please feel free to reach out to us if there are any further questions 65 yo F with E coli bacteremia without GI or urinary symptoms, though with pyuria on UA.  All of her symptomatology is c/c viral etiology - myalgias, cough, pleuritic CP and HA.  She describes severe rigors yesterday, c/c bacteremia. COVID 19 negative.  MRI with left sided pyelonephritis, blood cx ecoli sn to ceftriaxone    Suggest:  -MRI w/ left sided pyelonephritis  -bld cx w/ e. coli sn to ceftriaxone, needs total 14 d therapy until 3-30 (14 day duration)  -midline in place  -weekly cbc, cmp f/u with Dr. Reis outpatient - we will arrange    ID will sign off please feel free to reach out to us if there are any further questions

## 2020-03-17 NOTE — DISCHARGE NOTE PROVIDER - NSDCMRMEDTOKEN_GEN_ALL_CORE_FT
docusate sodium 100 mg oral capsule: 1 cap(s) orally 3 times a day  Ginger Root oral capsule: 1 cap(s) orally once a day  Multiple Vitamins oral capsule: 1 cap(s) orally once a day  polyethylene glycol 3350 oral powder for reconstitution: 17 gram(s) orally once a day  Probiotic Formula oral capsule: 1 cap(s) orally once a day  senna oral tablet: 2 tab(s) orally once a day (at bedtime), As needed, Constipation  Turmeric 500 mg oral capsule: 2 cap(s) orally once a day benzonatate 100 mg oral capsule: 1 cap(s) orally 3 times a day, As needed, Cough  cefTRIAXone 2 g intravenous injection: 2 gram(s) intravenous every 24 hours  docusate sodium 100 mg oral capsule: 1 cap(s) orally 3 times a day  Ginger Root oral capsule: 1 cap(s) orally once a day  Multiple Vitamins oral capsule: 1 cap(s) orally once a day  polyethylene glycol 3350 oral powder for reconstitution: 17 gram(s) orally once a day  Probiotic Formula oral capsule: 1 cap(s) orally once a day  senna oral tablet: 2 tab(s) orally once a day (at bedtime), As needed, Constipation  sodium chloride 0.9% injectable solution: 1 application injectable once a day  Turmeric 500 mg oral capsule: 2 cap(s) orally once a day

## 2020-03-17 NOTE — DISCHARGE NOTE PROVIDER - HOSPITAL COURSE
#Discharge: do not delete        Patient is 64 year old female with no PMHx after having fever, myalgias, for 1 week associated with dry cough and    rhinorrhea, and body aches, RVP/COVID negative, and found to have E. Coli bacteremia most likely 2/2 UTI.         Problem List/Main Diagnoses (system-based):         1. E. Coli Bacteremia: Patient presented with fever, malaise, rhinorrhea, and overall feeling unwell, with dry cough, and body aches. She was prescribed Tamiflu and outpatient but did not improve. RVP negative/ COVID negative. UA was positive for UTI but urine culture was negative. CT without any evidence of PNA. Blood cultures were drawn with first showing E.Coli sensitive to Ceftriaxone, and then repeat surveillance Cx negative. She was started on Ceftriaxone 2 gram daily, with improvement. Ct abdomena dn pelvis and MR abdomen were both done that showed left upper pyelonephritis and splenic hemangiomas. Infectious Disease was consulted during her stay and recommended 2 weeks of Ceftriaxone for 2 weeks.             Inpatient treatment course: As above     New medications: Ceftriaxone 2 gram for a total of 2 weeks     Labs to be followed outpatient: N/A    Exam to be followed outpatient: N/A #Discharge: do not delete        Patient is 64 year old female with no PMHx after having fever, myalgias, for 1 week associated with dry cough and    rhinorrhea, and body aches, RVP/COVID negative, and found to have E. Coli bacteremia most likely 2/2 UTI.     Continued having a dry cough so repeat COVID sent 3/18.        Problem List/Main Diagnoses (system-based):         1. E. Coli Bacteremia: Patient presented with fever, malaise, rhinorrhea, and overall feeling unwell, with dry cough, and body aches. She was prescribed Tamiflu and outpatient but did not improve. RVP negative/ COVID negative. UA was positive for UTI but urine culture was negative. CT without any evidence of PNA. Blood cultures were drawn with first showing E.Coli sensitive to Ceftriaxone, and then repeat surveillance Cx negative. She was started on Ceftriaxone 2 gram daily, with improvement. Ct abdomena dn pelvis and MR abdomen were both done that showed left upper pyelonephritis and splenic hemangiomas. Infectious Disease was consulted during her stay and recommended 2 weeks of Ceftriaxone for 2 weeks.         2. Coronavirus - Negative on initial swab. Continued having dry cough so re-swabbed 3/18 and result pending.            Inpatient treatment course: As above     New medications: Ceftriaxone 2 gram for a total of 2 weeks     Labs to be followed outpatient: N/A    Exam to be followed outpatient: N/A

## 2020-03-17 NOTE — DISCHARGE NOTE PROVIDER - PROVIDER TOKENS
FREE:[LAST:[Dewitt],FIRST:[Dr. Lucas],PHONE:[phone]],FAX:[(   )    -],ADDRESS:[[address] [place] [address]],FOLLOWUP:[1 week]]

## 2020-03-17 NOTE — PROGRESS NOTE ADULT - SUBJECTIVE AND OBJECTIVE BOX
Subjective:  patient resting comfortably, no issues with urination, no sob, cp, nausea, vomiting, or diarrhea  MR shows l. sided pyelonephritis    Vital Signs Last 24 Hrs  T(C): 36.7 (17 Mar 2020 19:31), Max: 37.2 (17 Mar 2020 05:05)  T(F): 98.1 (17 Mar 2020 19:31), Max: 98.9 (17 Mar 2020 05:05)  HR: 64 (17 Mar 2020 19:31) (63 - 65)  BP: 94/58 (17 Mar 2020 19:31) (94/58 - 103/66)  BP(mean): --  RR: 16 (17 Mar 2020 19:31) (16 - 17)  SpO2: 98% (17 Mar 2020 19:31) (96% - 98%)    PHYSICAL EXAM:    Constitutional: WDWN, NAD  HEENT: PERRL, EOMI, sclera non-icteric, neck supple, trachea midline, no masses, no JVD, MMM, good dentition  Respiratory: CTA b/l, good air entry b/l, no wheezing, no rhonchi, no rales, without accessory muscle use and no intercostal retractions  Cardiovascular: RRR, normal S1S2, no M/R/G  Gastrointestinal: soft, NTND, no masses palpable, BS normal  Extremities: Warm, well perfused, pulses equal bilateral upper and lower extremities, no edema, no clubbing  Neurological: AAOx3, CN Grossly intact  Skin: Normal temperature, warm, dry    MEDICATIONS  (STANDING):  aspirin enteric coated 81 milliGRAM(s) Oral every 24 hours  cefTRIAXone   IVPB 2000 milliGRAM(s) IV Intermittent every 24 hours  guaifenesin/dextromethorphan  Syrup 10 milliLiter(s) Oral three times a day    MEDICATIONS  (PRN):  acetaminophen   Tablet .. 650 milliGRAM(s) Oral every 6 hours PRN Temp greater or equal to 38C (100.4F), Moderate Pain (4 - 6), Severe Pain (7 - 10)  benzonatate 100 milliGRAM(s) Oral three times a day PRN Cough      Allergies  No Known Allergies  Intolerances    LABS:                         12.8   6.96  )-----------( 314      ( 17 Mar 2020 07:11 )             39.9     03-17    137  |  102  |  15  ----------------------------<  118<H>  4.2   |  24  |  0.73    Ca    9.6      17 Mar 2020 12:26  Mg     2.1     03-17    TPro  7.2  /  Alb  3.7  /  TBili  <0.2  /  DBili  x   /  AST  19  /  ALT  44  /  AlkPhos  124<H>  03-17    COVID-19 PCR . (03.15.20 @ 14:18)    COVID-19 PCR: NotDetec: As with all clinical testing, results should be correlated with clinical  findings.  This test has not been FDA cleared or approved.  Negative results do not preclude 2019-nCov infection and should not be  used as the sole basis for treatment or other patient management  decisions.  https://www.fda.gov/media/653787/download  https://www.fda.gov/media/748837/download    Culture - Blood (03.14.20 @ 20:28)    Gram Stain:   Aerobic and Anaerobic Bottle: Gram Negative Rods  Result called to and read back by_ Gene Soto RN([id number]) 03/15/2020  06:49:43    -  Piperacillin/Tazobactam: S <=8    -  Tobramycin: S <=2    -  Trimethoprim/Sulfamethoxazole: R >2/38    -  Ampicillin: R >16 These ampicillin results predict results for amoxicillin    -  Ampicillin/Sulbactam: I 16/8 Enterobacter, Citrobacter, and Serratia may develop resistance during prolonged therapy (3-4 days)    -  Cefazolin: S <=2 Enterobacter, Citrobacter, and Serratia may develop resistance during prolonged therapy (3-4 days)    -  Ceftriaxone: S <=1 Enterobacter, Citrobacter, and Serratia may develop resistance during prolonged therapy    -  Gentamicin: S <=1    Specimen Source: .Blood Blood    Organism: Escherichia coli    Culture Results:   Growth in aerobic and anaerobic bottles: Escherichia coli    Organism Identification: Escherichia coli    Method Type: JONNA

## 2020-03-17 NOTE — DISCHARGE NOTE PROVIDER - NSDCCPTREATMENT_GEN_ALL_CORE_FT
PRINCIPAL PROCEDURE  Procedure: MR abdomen  Findings and Treatment: Images of the upper abdomen demonstrate no focal hepatic abnormalities. The right lobe of liver measures 22.8 cm superoinferior dimension. Enlarged liver versus Reidel's lobe. No dilated intrahepatic or extrahepatic bile ducts are seen. The gallbladder is normal in appearance.  The pancreas is normal in appearance.  The pancreatic duct is not dilated.  The pancreatic duct inserts onto the major papilla.  No splenomegaly . Multiple splenic T2 hyperintensities up to 1.4 cm diameter. The lesions show delayed enhancement as seen on thefive minute post gadolinium sequence.  The adrenal glands are unremarkable.  Both kidneys opacify symmetrically with contrast without evidence of hydronephrosis.  No solid or enhancing renal masses are identified. Wedge-shaped areas of restricted diffusion seen predominantly in the left kidney particularly upper pole suggestive of acute pyelonephritis. A few bilateral subcentimeter renal cortical cysts. Subtle trace bilateral perinephric fluid.  No abdominal aortic aneurysm is seen.  No retroperitoneal lymphadenopathy is seen.

## 2020-03-17 NOTE — PROGRESS NOTE ADULT - ATTENDING COMMENTS
I have reviewed the medical record, including laboratory and radiographic studies, interviewed and examined the patient and discussed the plan with Dr. Mace, the ID Resident.  Agree with above. Reviewed CT findings with Radiologist - there are findings at L UP junction concerning for pyelonephritis.  Splenic lesions not particularly enhancing - less likely abscesses but would do MRI as above to further assess kidney and spleen. Urine culture showed no growth but was obtained after she had received antibiotics.  Will continue to follow with you – ID Team 1.
I have reviewed the medical record, including laboratory and radiographic studies, interviewed and examined the patient and discussed the plan with Dr. Sullivan, the ID Resident.  Agree with above. Please recall if further ID input is desired – ID Team 1.
pt seen and examined by me case d/w housestaff agree with VS, PE, assessment and plan as outlined above
pt seen and examined by me case d/w housestaff agree with VS, PE, assessment and plan as outlined above    1- GRN bacteremia- ecoli  source likely pyelo  ceftriaxone sensitive  plan for midline and 2 week iv abx  clinically significantly improved

## 2020-03-17 NOTE — PROGRESS NOTE ADULT - SUBJECTIVE AND OBJECTIVE BOX
SUBJECTIVE / INTERVAL HPI: Patient seen and examined at bedside. No acute events overnight. Patient went for MR last night of her abdomen. Says that she still has a cough but overall feels much improved. Denies any fever, chills, body aches.     VITAL SIGNS:  Vital Signs Last 24 Hrs  T(C): 37.2 (17 Mar 2020 05:05), Max: 37.2 (16 Mar 2020 18:06)  T(F): 98.9 (17 Mar 2020 05:05), Max: 98.9 (16 Mar 2020 18:06)  HR: 63 (17 Mar 2020 05:05) (59 - 65)  BP: 103/66 (17 Mar 2020 05:05) (97/60 - 106/70)  BP(mean): --  RR: 16 (17 Mar 2020 05:05) (16 - 17)  SpO2: 96% (17 Mar 2020 05:05) (94% - 99%)    REVIEW OF SYSTEMS:    All other review of systems is negative unless indicated above.    PHYSICAL EXAM:  General: WDWN, NAD resting comfortably   HEENT: NC/AT; PERRL, clear conjunctiva  Neck: supple, no JVD,   Cardiovascular: +S1/S2; RRR, no M/R/G  Respiratory: CTA b/l; no W/R/R  Gastrointestinal: soft, NT/ND; +BSx4  Extremities: WWP; 2+ peripheral pulses; no edema   Neurological: AAOx3; no focal deficits    MEDICATIONS:  MEDICATIONS  (STANDING):  aspirin enteric coated 81 milliGRAM(s) Oral every 24 hours  cefTRIAXone   IVPB 2000 milliGRAM(s) IV Intermittent every 24 hours  sodium zirconium cyclosilicate 5 Gram(s) Oral once    MEDICATIONS  (PRN):  acetaminophen   Tablet .. 650 milliGRAM(s) Oral every 6 hours PRN Temp greater or equal to 38C (100.4F), Moderate Pain (4 - 6), Severe Pain (7 - 10)  benzonatate 100 milliGRAM(s) Oral three times a day PRN Cough      ALLERGIES:  Allergies    No Known Allergies    Intolerances        LABS:                        12.8   6.96  )-----------( 314      ( 17 Mar 2020 07:11 )             39.9     03-17    140  |  102  |  13  ----------------------------<  106<H>  6.1<H>   |  28  |  0.86    Ca    9.9      17 Mar 2020 07:11  Mg     2.1     03-17    TPro  7.2  /  Alb  3.7  /  TBili  <0.2  /  DBili  x   /  AST  19  /  ALT  44  /  AlkPhos  124<H>  03-17      Urinalysis Basic - ( 15 Mar 2020 08:48 )    Color: Yellow / Appearance: Clear / SG: <=1.005 / pH: x  Gluc: x / Ketone: NEGATIVE  / Bili: Negative / Urobili: 0.2 E.U./dL   Blood: x / Protein: NEGATIVE mg/dL / Nitrite: NEGATIVE   Leuk Esterase: Large / RBC: 5-10 /HPF / WBC Many /HPF   Sq Epi: x / Non Sq Epi: 0-5 /HPF / Bacteria: Present /HPF      CAPILLARY BLOOD GLUCOSE          RADIOLOGY & ADDITIONAL TESTS: Reviewed.

## 2020-03-18 ENCOUNTER — TRANSCRIPTION ENCOUNTER (OUTPATIENT)
Age: 65
End: 2020-03-18

## 2020-03-18 VITALS
SYSTOLIC BLOOD PRESSURE: 108 MMHG | TEMPERATURE: 98 F | DIASTOLIC BLOOD PRESSURE: 60 MMHG | HEART RATE: 60 BPM | OXYGEN SATURATION: 96 % | RESPIRATION RATE: 16 BRPM

## 2020-03-18 LAB
ANION GAP SERPL CALC-SCNC: 12 MMOL/L — SIGNIFICANT CHANGE UP (ref 5–17)
BUN SERPL-MCNC: 18 MG/DL — SIGNIFICANT CHANGE UP (ref 7–23)
CALCIUM SERPL-MCNC: 9.6 MG/DL — SIGNIFICANT CHANGE UP (ref 8.4–10.5)
CHLORIDE SERPL-SCNC: 101 MMOL/L — SIGNIFICANT CHANGE UP (ref 96–108)
CO2 SERPL-SCNC: 27 MMOL/L — SIGNIFICANT CHANGE UP (ref 22–31)
CREAT SERPL-MCNC: 0.8 MG/DL — SIGNIFICANT CHANGE UP (ref 0.5–1.3)
GLUCOSE SERPL-MCNC: 114 MG/DL — HIGH (ref 70–99)
HCT VFR BLD CALC: 39.4 % — SIGNIFICANT CHANGE UP (ref 34.5–45)
HGB BLD-MCNC: 12.6 G/DL — SIGNIFICANT CHANGE UP (ref 11.5–15.5)
MAGNESIUM SERPL-MCNC: 2 MG/DL — SIGNIFICANT CHANGE UP (ref 1.6–2.6)
MCHC RBC-ENTMCNC: 30 PG — SIGNIFICANT CHANGE UP (ref 27–34)
MCHC RBC-ENTMCNC: 32 GM/DL — SIGNIFICANT CHANGE UP (ref 32–36)
MCV RBC AUTO: 93.8 FL — SIGNIFICANT CHANGE UP (ref 80–100)
NRBC # BLD: 0 /100 WBCS — SIGNIFICANT CHANGE UP (ref 0–0)
PLATELET # BLD AUTO: 301 K/UL — SIGNIFICANT CHANGE UP (ref 150–400)
POTASSIUM SERPL-MCNC: 4.2 MMOL/L — SIGNIFICANT CHANGE UP (ref 3.5–5.3)
POTASSIUM SERPL-SCNC: 4.2 MMOL/L — SIGNIFICANT CHANGE UP (ref 3.5–5.3)
RBC # BLD: 4.2 M/UL — SIGNIFICANT CHANGE UP (ref 3.8–5.2)
RBC # FLD: 13 % — SIGNIFICANT CHANGE UP (ref 10.3–14.5)
SODIUM SERPL-SCNC: 140 MMOL/L — SIGNIFICANT CHANGE UP (ref 135–145)
STRONGYLOIDES AB SER-ACNC: NEGATIVE — SIGNIFICANT CHANGE UP
WBC # BLD: 4.48 K/UL — SIGNIFICANT CHANGE UP (ref 3.8–10.5)
WBC # FLD AUTO: 4.48 K/UL — SIGNIFICANT CHANGE UP (ref 3.8–10.5)

## 2020-03-18 PROCEDURE — 80048 BASIC METABOLIC PNL TOTAL CA: CPT

## 2020-03-18 PROCEDURE — 71250 CT THORAX DX C-: CPT

## 2020-03-18 PROCEDURE — 87581 M.PNEUMON DNA AMP PROBE: CPT

## 2020-03-18 PROCEDURE — 87486 CHLMYD PNEUM DNA AMP PROBE: CPT

## 2020-03-18 PROCEDURE — 74177 CT ABD & PELVIS W/CONTRAST: CPT

## 2020-03-18 PROCEDURE — 94640 AIRWAY INHALATION TREATMENT: CPT

## 2020-03-18 PROCEDURE — 83735 ASSAY OF MAGNESIUM: CPT

## 2020-03-18 PROCEDURE — 99233 SBSQ HOSP IP/OBS HIGH 50: CPT | Mod: GC

## 2020-03-18 PROCEDURE — A9585: CPT

## 2020-03-18 PROCEDURE — 80053 COMPREHEN METABOLIC PANEL: CPT

## 2020-03-18 PROCEDURE — 71045 X-RAY EXAM CHEST 1 VIEW: CPT

## 2020-03-18 PROCEDURE — 36415 COLL VENOUS BLD VENIPUNCTURE: CPT

## 2020-03-18 PROCEDURE — 87633 RESP VIRUS 12-25 TARGETS: CPT

## 2020-03-18 PROCEDURE — 87635 SARS-COV-2 COVID-19 AMP PRB: CPT

## 2020-03-18 PROCEDURE — 87798 DETECT AGENT NOS DNA AMP: CPT

## 2020-03-18 PROCEDURE — 96374 THER/PROPH/DIAG INJ IV PUSH: CPT

## 2020-03-18 PROCEDURE — 83605 ASSAY OF LACTIC ACID: CPT

## 2020-03-18 PROCEDURE — 87040 BLOOD CULTURE FOR BACTERIA: CPT

## 2020-03-18 PROCEDURE — 99285 EMERGENCY DEPT VISIT HI MDM: CPT | Mod: 25

## 2020-03-18 PROCEDURE — 74183 MRI ABD W/O CNTR FLWD CNTR: CPT

## 2020-03-18 PROCEDURE — 85027 COMPLETE CBC AUTOMATED: CPT

## 2020-03-18 PROCEDURE — 86682 HELMINTH ANTIBODY: CPT

## 2020-03-18 PROCEDURE — 87150 DNA/RNA AMPLIFIED PROBE: CPT

## 2020-03-18 PROCEDURE — 84145 PROCALCITONIN (PCT): CPT

## 2020-03-18 PROCEDURE — 76937 US GUIDE VASCULAR ACCESS: CPT

## 2020-03-18 PROCEDURE — ZZZZZ: CPT

## 2020-03-18 PROCEDURE — 81001 URINALYSIS AUTO W/SCOPE: CPT

## 2020-03-18 PROCEDURE — 83615 LACTATE (LD) (LDH) ENZYME: CPT

## 2020-03-18 PROCEDURE — 96375 TX/PRO/DX INJ NEW DRUG ADDON: CPT

## 2020-03-18 PROCEDURE — 36410 VNPNXR 3YR/> PHY/QHP DX/THER: CPT

## 2020-03-18 PROCEDURE — 87186 SC STD MICRODIL/AGAR DIL: CPT

## 2020-03-18 PROCEDURE — 85025 COMPLETE CBC W/AUTO DIFF WBC: CPT

## 2020-03-18 PROCEDURE — 87086 URINE CULTURE/COLONY COUNT: CPT

## 2020-03-18 RX ORDER — SODIUM CHLORIDE 9 MG/ML
10 INJECTION INTRAMUSCULAR; INTRAVENOUS; SUBCUTANEOUS
Refills: 0 | Status: DISCONTINUED | OUTPATIENT
Start: 2020-03-18 | End: 2020-03-18

## 2020-03-18 RX ORDER — CHLORHEXIDINE GLUCONATE 213 G/1000ML
1 SOLUTION TOPICAL
Refills: 0 | Status: DISCONTINUED | OUTPATIENT
Start: 2020-03-18 | End: 2020-03-18

## 2020-03-18 RX ORDER — CEFTRIAXONE 500 MG/1
2 INJECTION, POWDER, FOR SOLUTION INTRAMUSCULAR; INTRAVENOUS
Qty: 0 | Refills: 0 | DISCHARGE
Start: 2020-03-18

## 2020-03-18 RX ORDER — SODIUM CHLORIDE 9 MG/ML
1 INJECTION INTRAMUSCULAR; INTRAVENOUS; SUBCUTANEOUS
Qty: 0 | Refills: 0 | DISCHARGE
Start: 2020-03-18

## 2020-03-18 RX ADMIN — CEFTRIAXONE 100 MILLIGRAM(S): 500 INJECTION, POWDER, FOR SOLUTION INTRAMUSCULAR; INTRAVENOUS at 06:45

## 2020-03-18 RX ADMIN — Medication 10 MILLILITER(S): at 06:45

## 2020-03-18 RX ADMIN — Medication 81 MILLIGRAM(S): at 06:46

## 2020-03-18 RX ADMIN — Medication 10 MILLILITER(S): at 15:14

## 2020-03-18 NOTE — PROGRESS NOTE ADULT - PROBLEM SELECTOR PLAN 3
F: none, encourage PO,   E: replete PRN  N: PO regular  DVT: none, OOB   GI: none F: None  E: Replete PRN  N: Regular  GI PPX: None  DVT PPX: None  Code: Full  Dispo: COVID-1

## 2020-03-18 NOTE — PROGRESS NOTE ADULT - PROBLEM SELECTOR PLAN 1
Patient presented with severe sepsis. RVP and COVID negative. UA was positive but urine culture negative. CT without any evidence of PNA. CT abdomen shows likely left upper pyelonephritis, and possible splenic hemangiomas.   - Blood Cx + for E.Coli   - surveillance cx shows NGTD   - C/w with Ceftriaxone 2 gram daily   - f/u urine culture sensitivities   - F/u ID recs   - CT Abdomen showing left upper pyelonephritis, with possible splenic hemangiomas   - F/u MR Abdomen Severe sepsis criteria met on admission. RVP and COVID negative. CT chest normal but CT abdomen with likely left pyelonephritis and possible splenic hemangiomas.    -E. coli positive blood cultures  -Surveillance blood cultures NGTD  -Continue Ceftriaxone 2g QD until 3/30 via midline  -Follow up further ID recommendations

## 2020-03-18 NOTE — PROGRESS NOTE ADULT - SUBJECTIVE AND OBJECTIVE BOX
OVERNIGHT EVENTS:    SUBJECTIVE: Patient seen and examined at the bedside.     Vital Signs Last 12 Hrs  T(F): 97.9 (03-18-20 @ 08:50), Max: 97.9 (03-18-20 @ 04:57)  HR: 53 (03-18-20 @ 08:50) (53 - 54)  BP: 93/59 (03-18-20 @ 08:50) (93/59 - 95/54)  BP(mean): --  RR: 16 (03-18-20 @ 08:50) (16 - 16)  SpO2: 96% (03-18-20 @ 08:50) (94% - 96%)  I&O's Summary    17 Mar 2020 07:01  -  18 Mar 2020 07:00  --------------------------------------------------------  IN: 1350 mL / OUT: 1200 mL / NET: 150 mL        PHYSICAL EXAM:  General: In no acute distress, resting comfortably in bed  HEENT: NCAT, PERRL, EOMI, no conjunctival pallor or scleral icterus, MMM  Neck: Supple, no JVD  Respiratory: Clear to auscultation bilaterally with no wheezes, rales, or rhonchi appreciated  Cardiovascular: RRR, normal S1 and S2, no murmurs, rubs, or gallops appreciated  Vascular: 2+ radial and DP pulses  Abdomen: Soft, NT/ND. Bowel sounds present in all four quadrants with no guarding, rebound tenderness, or palpable masses  Extremities: Warm and well perfused. No clubbing, cyanosis, or edema noted  Skin: No gross skin abnormalities or rashes noted  Neuro: AAOx3 with no cranial nerve deficits. Strength and sensation intact throughout.    LABS:                        12.6   4.48  )-----------( 301      ( 18 Mar 2020 07:50 )             39.4     03-18    140  |  101  |  18  ----------------------------<  114<H>  4.2   |  27  |  0.80    Ca    9.6      18 Mar 2020 07:50  Mg     2.0     03-18    TPro  7.2  /  Alb  3.7  /  TBili  <0.2  /  DBili  x   /  AST  19  /  ALT  44  /  AlkPhos  124<H>  03-17          RADIOLOGY & ADDITIONAL TESTS: Reviewed.    MEDICATIONS  (STANDING):  aspirin enteric coated 81 milliGRAM(s) Oral every 24 hours  cefTRIAXone   IVPB 2000 milliGRAM(s) IV Intermittent every 24 hours  guaifenesin/dextromethorphan  Syrup 10 milliLiter(s) Oral three times a day    MEDICATIONS  (PRN):  acetaminophen   Tablet .. 650 milliGRAM(s) Oral every 6 hours PRN Temp greater or equal to 38C (100.4F), Moderate Pain (4 - 6), Severe Pain (7 - 10)  benzonatate 100 milliGRAM(s) Oral three times a day PRN Cough      Allergies    No Known Allergies    Intolerances OVERNIGHT EVENTS: None    SUBJECTIVE: Patient seen and examined at the bedside. Complains of dry cough only.    Vital Signs Last 12 Hrs  T(F): 97.9 (03-18-20 @ 08:50), Max: 97.9 (03-18-20 @ 04:57)  HR: 53 (03-18-20 @ 08:50) (53 - 54)  BP: 93/59 (03-18-20 @ 08:50) (93/59 - 95/54)  BP(mean): --  RR: 16 (03-18-20 @ 08:50) (16 - 16)  SpO2: 96% (03-18-20 @ 08:50) (94% - 96%)  I&O's Summary    17 Mar 2020 07:01  -  18 Mar 2020 07:00  --------------------------------------------------------  IN: 1350 mL / OUT: 1200 mL / NET: 150 mL        PHYSICAL EXAM:  General: In no acute distress, resting comfortably in bed  HEENT: NCAT, PERRL, EOMI, MMM  Neck: Supple, no JVD  Respiratory: Clear to auscultation bilaterally  Cardiovascular: RRR, normal S1 and S2  Vascular: 2+ radial and DP pulses  Abdomen: Soft, NT/ND. Bowel sounds present in all four quadrants  Extremities: Warm and well perfused. No clubbing, cyanosis, or edema noted  Skin: No gross skin abnormalities or rashes noted  Neuro: AAOx3 with no cranial nerve deficits. Strength and sensation intact throughout.    LABS:                        12.6   4.48  )-----------( 301      ( 18 Mar 2020 07:50 )             39.4     03-18    140  |  101  |  18  ----------------------------<  114<H>  4.2   |  27  |  0.80    Ca    9.6      18 Mar 2020 07:50  Mg     2.0     03-18    TPro  7.2  /  Alb  3.7  /  TBili  <0.2  /  DBili  x   /  AST  19  /  ALT  44  /  AlkPhos  124<H>  03-17          RADIOLOGY & ADDITIONAL TESTS: Reviewed.    MEDICATIONS  (STANDING):  aspirin enteric coated 81 milliGRAM(s) Oral every 24 hours  cefTRIAXone   IVPB 2000 milliGRAM(s) IV Intermittent every 24 hours  guaifenesin/dextromethorphan  Syrup 10 milliLiter(s) Oral three times a day    MEDICATIONS  (PRN):  acetaminophen   Tablet .. 650 milliGRAM(s) Oral every 6 hours PRN Temp greater or equal to 38C (100.4F), Moderate Pain (4 - 6), Severe Pain (7 - 10)  benzonatate 100 milliGRAM(s) Oral three times a day PRN Cough      Allergies    No Known Allergies    Intolerances

## 2020-03-18 NOTE — PROGRESS NOTE ADULT - ASSESSMENT
Ms. Birch is a 64F nonsmoker, no pmh referred by pcp (Christine Garcia in setting of fevers/chills/malaise/myalgias for 1 week with occasional dry cough and rhinnorhea, body aches, but COVID negative, was found to have E.coli bacteremia with unknown source. 62-year-old female with no past medical history who presented with 1 week of fevers, chills, malaise, myalgia, cough, and rhinorrhea. Initially COVID negative but E. coli bacteremia. Known positive exposure from a ski trip so re-tested.

## 2020-03-18 NOTE — DISCHARGE NOTE NURSING/CASE MANAGEMENT/SOCIAL WORK - PATIENT PORTAL LINK FT
You can access the FollowMyHealth Patient Portal offered by [facility] by registering at the following website: [email or web address]. By joining [employer]’s FollowMyHealth portal, you will also be able to view your health information using other applications (apps) compatible with our system.

## 2020-03-18 NOTE — CONSULT NOTE ADULT - SUBJECTIVE AND OBJECTIVE BOX
V[facility] Service Consult Note    [facility]x:  Bacteremia due to Escherichia coli: Bacteremia due to Escherichia coli  Bacteremia: Bacteremia  Transition of care performed with sharing of clinical summary: Transition of care performed with sharing of clinical summary  Nutrition, metabolism, and development symptoms: Nutrition, metabolism, and development symptoms  Hypoxia: Hypoxia             Diagnosis: bacteramia    Indications for Vascular Access (Check all that apply)  [x  ]  Antibiotic Therapy       Antibiotic Prescribed:  ceftriaxone                                                        Expected Duration of Therapy:       x2 weeks        [  ]  IV Hydration  [  ]  Total Parenteral Nutrition  [  ]  Chemotherapy  [  ]  Difficult Venous Access  [  ]  CVP monitoring  [  ]  Medications with high potential for tissue necrosis on extravasation  [  ]  Other    Screening (Check all that apply)  Previous Radiation to chest  [  ] Yes      [x  ]  No  Breast Cancer                          [  ] Left     [  ]  Right    [x  ]  No  Lymph Node Dissection         [  ] Left     [  ]  Right    [x  ]  No  Pacemaker or ICD                   [  ] Left     [  ]  Right    [x  ]  No  Upper Extremity DVT             [  ] Left     [  ]  Right    [x  ]  No  Chronic Kidney Disease         [  ]  Yes     [x  ]  No  Hemodialysis                           [  ]  Yes     [ x]  No  AV Fistula/ Graft                     [  ]  Left    [  ]  Right    [ x ]  No  Temp>101F in past 24 H       [  ]  Yes     [ x ]  No  H/O PICC/Midline                   [  ]  Yes     [ x ]  No    Lab data:                        12.6   4.48  )-----------( 301      ( 18 Mar 2020 07:50 )             39.4     03-18    140  |  101  |  18  ----------------------------<  114<H>  4.2   |  27  |  0.80    Ca    9.6      18 Mar 2020 07:50  Mg     2.0     03-18    TPro  7.2  /  Alb  3.7  /  TBili  <0.2  /  DBili  x   /  AST  19  /  ALT  44  /  AlkPhos  124<H>  03-17              I have reviewed the chart, interviewed and examined the patient and determined that this patient:  [  ] Is a candidate for a PICC line  [x  ] Is a candidate for a Midline  [  ] Is not a candidate for vascular access device (reason)    Lumens:    [x  ] Single  [  ] Double

## 2020-03-19 LAB — SARS-COV-2 RNA SPEC QL NAA+PROBE: SIGNIFICANT CHANGE UP

## 2020-03-21 LAB
CULTURE RESULTS: SIGNIFICANT CHANGE UP
SPECIMEN SOURCE: SIGNIFICANT CHANGE UP

## 2020-03-26 DIAGNOSIS — E78.5 HYPERLIPIDEMIA, UNSPECIFIED: ICD-10-CM

## 2020-03-26 DIAGNOSIS — N39.0 URINARY TRACT INFECTION, SITE NOT SPECIFIED: ICD-10-CM

## 2020-03-26 DIAGNOSIS — M19.91 PRIMARY OSTEOARTHRITIS, UNSPECIFIED SITE: ICD-10-CM

## 2020-03-26 DIAGNOSIS — Z96.641 PRESENCE OF RIGHT ARTIFICIAL HIP JOINT: ICD-10-CM

## 2020-03-26 DIAGNOSIS — A41.51 SEPSIS DUE TO ESCHERICHIA COLI [E. COLI]: ICD-10-CM

## 2020-03-26 DIAGNOSIS — R09.02 HYPOXEMIA: ICD-10-CM

## 2020-03-26 DIAGNOSIS — A41.9 SEPSIS, UNSPECIFIED ORGANISM: ICD-10-CM

## 2020-03-26 DIAGNOSIS — G47.30 SLEEP APNEA, UNSPECIFIED: ICD-10-CM

## 2020-03-26 DIAGNOSIS — B96.20 UNSPECIFIED ESCHERICHIA COLI [E. COLI] AS THE CAUSE OF DISEASES CLASSIFIED ELSEWHERE: ICD-10-CM

## 2020-10-06 ENCOUNTER — APPOINTMENT (OUTPATIENT)
Dept: ORTHOPEDIC SURGERY | Facility: CLINIC | Age: 65
End: 2020-10-06
Payer: MEDICARE

## 2020-10-06 DIAGNOSIS — Z96.642 PRESENCE OF LEFT ARTIFICIAL HIP JOINT: ICD-10-CM

## 2020-10-06 PROCEDURE — 99213 OFFICE O/P EST LOW 20 MIN: CPT

## 2020-10-09 NOTE — END OF VISIT
[FreeTextEntry3] : By signing my name below, I, Debra Logan, attest that this documentation has been prepared under the direction and in the presence of Rangel Herring PA-C.\par

## 2020-10-09 NOTE — HISTORY OF PRESENT ILLNESS
[de-identified] : 64 y/o F, s/p right THR with Dr. Jacobo on 06/04/19, s/p left THR with Dr. Quezada on 06/04/20 here for 1 year anniversary of the right hip and follow up of the left hip. Pt states her right hip is doing great. Able to do all her DLA. Is not taking any medications for pain  management. Regularly does at home strengthening exercises daily for both hips. Overall, pt bilateral hips are doing great.

## 2020-10-09 NOTE — ASSESSMENT
[FreeTextEntry1] : Assessment\par S/p right THR with Dr. Jacobo on 06/04/19\par S/p left THR with Dr. Quezada on 06/04/20\par \par Plan\par Continue with at home strengthening exercises \par \par F/U in 6 weeks for left THR and 1 year for right THR

## 2020-10-09 NOTE — PHYSICAL EXAM
[de-identified] : General: Not in acute distress, dressed appropriately, sitting on examination table\par Skin: Warm and dry, normal turgor, no rashes\par Neurological: AOx3, Cranial nerves grossly in tact\par Psych: Mood and affect appropriate\par \par Right Hip: Well healed surgical incision. No swelling edema erythema redness or drainage. Nontender.. ROM: Not assessed. 5/5 strength. Normal gait. Ambulates with no assisted devices. \par \par Left Hip: Well healed surgical incision. No swelling edema erythema redness or drainage. Nontender.. ROM: Not assessed. 5/5 strength. Normal gait. Ambulates with no assisted devices.  [de-identified] : X-ray of bilateral hips shows a normal THR.

## 2021-04-09 NOTE — PHYSICAL THERAPY INITIAL EVALUATION ADULT - ADDITIONAL COMMENTS
Other reason request has been forwarded to provider: micha brown Pt lives in a house with 3 steps to enter. Pt has all equipment from previous surgeries. Pt will need a hip cushion and a hip kit.

## 2021-10-28 ENCOUNTER — APPOINTMENT (OUTPATIENT)
Dept: ORTHOPEDIC SURGERY | Facility: CLINIC | Age: 66
End: 2021-10-28
Payer: MEDICARE

## 2021-10-28 VITALS — BODY MASS INDEX: 21.38 KG/M2 | RESPIRATION RATE: 16 BRPM | WEIGHT: 133 LBS | HEIGHT: 66 IN

## 2021-10-28 DIAGNOSIS — Z86.69 PERSONAL HISTORY OF OTHER DISEASES OF THE NERVOUS SYSTEM AND SENSE ORGANS: ICD-10-CM

## 2021-10-28 PROCEDURE — 99203 OFFICE O/P NEW LOW 30 MIN: CPT

## 2021-10-28 PROCEDURE — 73140 X-RAY EXAM OF FINGER(S): CPT | Mod: F9

## 2021-11-01 ENCOUNTER — TRANSCRIPTION ENCOUNTER (OUTPATIENT)
Age: 66
End: 2021-11-01

## 2021-11-02 ENCOUNTER — OUTPATIENT (OUTPATIENT)
Dept: OUTPATIENT SERVICES | Facility: HOSPITAL | Age: 66
LOS: 1 days | Discharge: HOME | End: 2021-11-02

## 2021-11-02 ENCOUNTER — APPOINTMENT (OUTPATIENT)
Dept: ORTHOPEDIC SURGERY | Facility: AMBULATORY SURGERY CENTER | Age: 66
End: 2021-11-02
Payer: MEDICARE

## 2021-11-02 DIAGNOSIS — Z98.89 OTHER SPECIFIED POSTPROCEDURAL STATES: Chronic | ICD-10-CM

## 2021-11-02 DIAGNOSIS — Z96.641 PRESENCE OF RIGHT ARTIFICIAL HIP JOINT: Chronic | ICD-10-CM

## 2021-11-02 DIAGNOSIS — Z98.890 OTHER SPECIFIED POSTPROCEDURAL STATES: Chronic | ICD-10-CM

## 2021-11-02 PROCEDURE — 26727 TREAT FINGER FRACTURE EACH: CPT | Mod: F9

## 2021-11-12 ENCOUNTER — APPOINTMENT (OUTPATIENT)
Dept: ORTHOPEDIC SURGERY | Facility: CLINIC | Age: 66
End: 2021-11-12
Payer: MEDICARE

## 2021-11-12 PROCEDURE — 29085 APPL CAST HAND&LWR FOREARM: CPT | Mod: RT,58

## 2021-11-12 PROCEDURE — 99024 POSTOP FOLLOW-UP VISIT: CPT

## 2021-11-12 PROCEDURE — 73130 X-RAY EXAM OF HAND: CPT | Mod: RT

## 2021-12-03 ENCOUNTER — APPOINTMENT (OUTPATIENT)
Dept: ORTHOPEDIC SURGERY | Facility: CLINIC | Age: 66
End: 2021-12-03
Payer: MEDICARE

## 2021-12-03 PROCEDURE — 20670 REMOVAL IMPLANT SUPERFICIAL: CPT | Mod: 58,RT

## 2021-12-03 PROCEDURE — 73140 X-RAY EXAM OF FINGER(S): CPT | Mod: F9

## 2021-12-03 PROCEDURE — 99024 POSTOP FOLLOW-UP VISIT: CPT

## 2021-12-03 RX ORDER — OXYCODONE AND ACETAMINOPHEN 5; 325 MG/1; MG/1
5-325 TABLET ORAL
Qty: 15 | Refills: 0 | Status: DISCONTINUED | COMMUNITY
Start: 2021-11-01 | End: 2021-12-03

## 2021-12-03 RX ORDER — MELOXICAM 15 MG/1
15 TABLET ORAL DAILY
Qty: 30 | Refills: 2 | Status: DISCONTINUED | COMMUNITY
Start: 2019-04-12 | End: 2021-12-03

## 2021-12-03 RX ORDER — CEPHALEXIN 500 MG/1
500 CAPSULE ORAL 3 TIMES DAILY
Qty: 21 | Refills: 0 | Status: DISCONTINUED | COMMUNITY
Start: 2021-11-01 | End: 2021-12-03

## 2021-12-29 ENCOUNTER — APPOINTMENT (OUTPATIENT)
Dept: ORTHOPEDIC SURGERY | Facility: CLINIC | Age: 66
End: 2021-12-29

## 2022-01-05 ENCOUNTER — APPOINTMENT (OUTPATIENT)
Dept: ORTHOPEDIC SURGERY | Facility: AMBULATORY SURGERY CENTER | Age: 67
End: 2022-01-05
Payer: MEDICARE

## 2022-01-05 DIAGNOSIS — S62.616D DISPLACED FRACTURE OF PROXIMAL PHALANX OF RIGHT LITTLE FINGER, SUBSEQUENT ENCOUNTER FOR FRACTURE WITH ROUTINE HEALING: ICD-10-CM

## 2022-01-05 PROCEDURE — 99024 POSTOP FOLLOW-UP VISIT: CPT

## 2022-01-05 PROCEDURE — 73140 X-RAY EXAM OF FINGER(S): CPT | Mod: F9

## 2022-03-31 ENCOUNTER — APPOINTMENT (OUTPATIENT)
Dept: INTERNAL MEDICINE | Facility: CLINIC | Age: 67
End: 2022-03-31
Payer: MEDICARE

## 2022-03-31 ENCOUNTER — APPOINTMENT (OUTPATIENT)
Dept: ORTHOPEDIC SURGERY | Facility: CLINIC | Age: 67
End: 2022-03-31
Payer: MEDICARE

## 2022-03-31 VITALS
WEIGHT: 133 LBS | RESPIRATION RATE: 16 BRPM | TEMPERATURE: 96.8 F | BODY MASS INDEX: 21.38 KG/M2 | HEIGHT: 66 IN | SYSTOLIC BLOOD PRESSURE: 104 MMHG | DIASTOLIC BLOOD PRESSURE: 70 MMHG | OXYGEN SATURATION: 97 % | HEART RATE: 76 BPM

## 2022-03-31 VITALS — BODY MASS INDEX: 21.38 KG/M2 | WEIGHT: 133 LBS | RESPIRATION RATE: 16 BRPM | HEIGHT: 66 IN

## 2022-03-31 DIAGNOSIS — K21.9 GASTRO-ESOPHAGEAL REFLUX DISEASE W/OUT ESOPHAGITIS: ICD-10-CM

## 2022-03-31 DIAGNOSIS — I82.890 ACUTE EMBOLISM AND THROMBOSIS OF OTHER SPECIFIED VEINS: ICD-10-CM

## 2022-03-31 DIAGNOSIS — M72.0 PALMAR FASCIAL FIBROMATOSIS [DUPUYTREN]: ICD-10-CM

## 2022-03-31 DIAGNOSIS — D73.89 OTHER DISEASES OF SPLEEN: ICD-10-CM

## 2022-03-31 DIAGNOSIS — Z83.3 FAMILY HISTORY OF DIABETES MELLITUS: ICD-10-CM

## 2022-03-31 PROCEDURE — 99213 OFFICE O/P EST LOW 20 MIN: CPT

## 2022-03-31 PROCEDURE — 73120 X-RAY EXAM OF HAND: CPT | Mod: 50

## 2022-03-31 PROCEDURE — 73070 X-RAY EXAM OF ELBOW: CPT | Mod: RT

## 2022-03-31 PROCEDURE — 99204 OFFICE O/P NEW MOD 45 MIN: CPT

## 2022-03-31 NOTE — ASSESSMENT
[FreeTextEntry1] : Patient appears to have a right fourth thickening of the pretendinous cord frequently refer to as Dupuytren's related to the previous trauma to the right hand.\par \par She also appears to have right cubital tunnel syndrome of several months duration not improving over time.\par \par The risks, benefits, alternatives and associated differential diagnosis was discussed with the patient. Options ranged from conservative care, therapy to surgical intervention were reviewed and all questions answered. Risks included incomplete resolution of symptoms, worsening of symptoms recurrence, tissue loss, functional loss and other risks associated with treatment of this condition Patient appeared to have an excellent understanding of the risks as well as differential diagnosis associated with this condition.\par \par She elected to proceed with night splinting as well as therapy and home program to changes position of her elbow and ergonomic modifications to reduce pressure on the ulnar nerve.\par \par If symptoms do not resolve she will follow-up with her neurologist for electrodiagnostic studies and based on results more aggressive forms of treatment may be considered.\par \par Education about Dupuytren's was performed in the office.\par Since there is no contracture she elected no treatment at this time and a home program was outlined to reduce the risk of per aggression by avoiding trauma to the area.\par Risk factors such as diabetes thyroid and HIV status was discussed\par \par Tabletop test was taught to the patient and if contracture begins to develop she will contact us immediately for reevaluation.

## 2022-03-31 NOTE — HISTORY OF PRESENT ILLNESS
[de-identified] : This is a 67 yo f with hx of OA\par She was having chest pain while in [place].\par  - went to ER -  ct scan with incidental findings.\par  - EKG - NSR\par - pain thought to be gastric - gerd.\par Drinks coffee\par she is currently taking omeprazole and sucralfate and the symptoms have abated.  \par h/o b/l THR\par \par runs yoga, active\par uptodate with vacines and had a mild case of covid.\par 5 siblings   -4 with DM\par \par mammo and pelvic us tomorrow -

## 2022-03-31 NOTE — HISTORY OF PRESENT ILLNESS
[Right] : right hand dominant [FreeTextEntry1] : Patient returns with possible Dupuytren's disease on the right palm of the hand and numbness and tingling on the right 4th and 5th digit. The symptoms began last year and she reports feeling it more in the morning.\par She has been having the numbness for the past several months which occur at night.  Is helped by straightening her elbow.  She does not complain of any neck or wrist issues.\par  She is 4 months status post- Closed reduction and internal fixation of right fifth proximal phalanx fracture DOS-11/2/21\par \par

## 2022-03-31 NOTE — PHYSICAL EXAM
[de-identified] : There is a negative Spurling test. There is no sensitivity or Tinel's over the axilla bilaterally in the area of the brachial plexus.\par \par The elbow has a full range of motion with no tenderness over the medial lateral epicondyles and no joint effusion.  There is a full range of motion of the elbow with the biceps and triceps intact and no tenderness in this region.  No pain upon forced flexion or extension.  No sensitivity over the radial and median nerves.\par \par No instability of the ulnar nerve bilaterally but there is a positive Tinel's and Phalen's at 20 seconds on the right which is negative on the left.\par \par The wrists have a symmetric range of motion bilaterally. There is no tenderness over the scaphoid, scapholunate or lunotriquetral ligaments bilaterally. There is a negative Flanagan test bilaterally. There is negative tenderness over the radial ulnar joint or TFCC and no evidence of instability bilaterally. No tenderness over the pisotriquetral or hamate hook or CMC joint bilaterally. There is 5 over 5 strength of the wrists bilaterally.\par \par  strength 45 pounds bilaterally\par Right-hand-dominant\par \par Full range of motion of the digits with active equaling passive range of motion but there is thickening of the pretendinous cord of the right fourth digit not resulting in contracture and there is no tenderness of this region A1 pulley MP PIP and DIP joint no evidence of digital malrotation.\par \par There is 2+ pulses of the radial arteries bilaterally. Shant's test shows excellent flow through the radial and ulnar arteries with an intact arch bilaterally.\par No masses palpable in Guyon's canal and no sensitivity over the ulnar nerve at the level of the wrist.\par There is good capillary refill of the digits bilaterally.There is no masses or sensitivity over the median and ulnar nerves at the level of the wrist. There is a negative Tinel's and negative Phalen's sign bilaterally. The sensation is grossly intact bilaterally.\par \par No evidence of intrinsic atrophy negative clawing negative Wartenberg symmetric bilaterally [de-identified] : PA and lateral of the right elbow shows no evidence of arthritis with joint spaces well-preserved and no soft tissue calcifications.\par \par PA and lateral of both hands show solid union of the previous right fifth proximal phalanx fracture without evidence of arthritis in this region no evidence of soft tissue calcifications.

## 2022-03-31 NOTE — PLAN
[FreeTextEntry1] : Ovarian left vein thrombus - ? if this is real\par  send for MRV\par no doac yet\par \par splenic lesions - send for MRI\par \par Gerd- for now continue PPI and then consider changing to H2 blocker.\par \par

## 2022-04-15 ENCOUNTER — APPOINTMENT (OUTPATIENT)
Dept: MRI IMAGING | Facility: HOSPITAL | Age: 67
End: 2022-04-15
Payer: MEDICARE

## 2022-04-15 ENCOUNTER — OUTPATIENT (OUTPATIENT)
Dept: OUTPATIENT SERVICES | Facility: HOSPITAL | Age: 67
LOS: 1 days | End: 2022-04-15
Payer: MEDICARE

## 2022-04-15 DIAGNOSIS — Z98.89 OTHER SPECIFIED POSTPROCEDURAL STATES: Chronic | ICD-10-CM

## 2022-04-15 DIAGNOSIS — Z98.890 OTHER SPECIFIED POSTPROCEDURAL STATES: Chronic | ICD-10-CM

## 2022-04-15 DIAGNOSIS — Z96.641 PRESENCE OF RIGHT ARTIFICIAL HIP JOINT: Chronic | ICD-10-CM

## 2022-04-15 PROCEDURE — G1004: CPT

## 2022-04-15 PROCEDURE — 72197 MRI PELVIS W/O & W/DYE: CPT | Mod: 26,MG

## 2022-04-15 PROCEDURE — 72197 MRI PELVIS W/O & W/DYE: CPT | Mod: MG

## 2022-04-15 PROCEDURE — 74183 MRI ABD W/O CNTR FLWD CNTR: CPT | Mod: MG

## 2022-04-15 PROCEDURE — 74183 MRI ABD W/O CNTR FLWD CNTR: CPT | Mod: 26,MG

## 2022-04-15 PROCEDURE — A9585: CPT

## 2022-04-17 ENCOUNTER — NON-APPOINTMENT (OUTPATIENT)
Age: 67
End: 2022-04-17

## 2022-05-13 ENCOUNTER — APPOINTMENT (OUTPATIENT)
Dept: ORTHOPEDIC SURGERY | Facility: CLINIC | Age: 67
End: 2022-05-13
Payer: MEDICARE

## 2022-05-13 ENCOUNTER — APPOINTMENT (OUTPATIENT)
Dept: ORTHOPEDIC SURGERY | Facility: CLINIC | Age: 67
End: 2022-05-13

## 2022-05-13 VITALS — BODY MASS INDEX: 21.38 KG/M2 | WEIGHT: 133 LBS | RESPIRATION RATE: 16 BRPM | HEIGHT: 66 IN

## 2022-05-13 PROCEDURE — 99213 OFFICE O/P EST LOW 20 MIN: CPT

## 2022-05-19 ENCOUNTER — APPOINTMENT (OUTPATIENT)
Dept: PHYSICAL MEDICINE AND REHAB | Facility: CLINIC | Age: 67
End: 2022-05-19
Payer: MEDICARE

## 2022-05-19 VITALS — BODY MASS INDEX: 22.18 KG/M2 | WEIGHT: 138 LBS | HEIGHT: 66 IN

## 2022-05-19 PROCEDURE — 99204 OFFICE O/P NEW MOD 45 MIN: CPT | Mod: 95

## 2022-05-19 NOTE — PHYSICAL EXAM
[Normal] : Oriented to person, place, and time, insight and judgement were intact and the affect was normal [de-identified] : no clawing neg tinels wrist or elbow

## 2022-05-19 NOTE — HISTORY OF PRESENT ILLNESS
[Home] : at home, [unfilled] , at the time of the visit. [Medical Office: ([facility])___] : at the medical office located in  [Verbal consent obtained from patient] : the patient, [unfilled] [FreeTextEntry1] : Ms. MYRNA NELSON is a very pleasant 66 year RHD retired  female who seen for evaluation of R elbow pain with numbness 4,5 th digits  that has been ongoing for a few months   without any specific injury or inciting event. The pain is located primarily forearm  intermittent in nature and described as feels swollen  . The pain is rated as 1/10 during today's visit, and ranges from 1-4/10. The patient's symptoms are aggravated by sleeping a night brace did not help -keeping arm straight relieves partially   and alleviated by [] . The patient denies any other  night pain, left hand numbness/tingling, either hand weakness, nor bowel/bladder dysfunction. The patient has no other complaints at this time.\par

## 2022-05-19 NOTE — REVIEW OF SYSTEMS
[Fever] : no fever [Chills] : no chills [Recent Change In Weight] : ~T no recent weight change [Lower Ext Edema] : no lower extremity edema [Joint Pain] : joint pain [Joint Stiffness] : no joint stiffness [Muscle Pain] : no muscle pain [Muscle Weakness] : no muscle weakness [Difficulty Walking] : no difficulty walking [Negative] : Heme/Lymph

## 2022-05-19 NOTE — ASSESSMENT
[FreeTextEntry1] : \par PLAN AND RECOMMENDATIONS :\par \par We discussed differential diagnosis and clinical impression\par agree with EMG rule out cubital tunnel syndrome \par \par Recommend\par .symptomatic care and support -elbow splint made it worst \par  medications NSAIDS as needed -  OTC fine (-personal preference )-(once or twice a day), -warned of  possible GI side effects -advised to take with meals or add over the counter Nexium, if sensitive\par \par  imaging defer to ortho \par \par  hydrotherapy /heat / cold for pain\par  continue  ergonomic precautions including pacing ,posture and frequent breaks while typing.\par \par  relative rest and avoidance of painful activity where possible \par  increasing activity as discussed \par  return for EMG \par Information given to patient about EMG and Nerve Conduction Study Examination including  planning, differential diagnosis to rule in /rule out ,duration of the test ,precautions (if patient on blood thinner.has bleeding disorder or  pace maker device etc -still possible to undergo with care), side effects(benign-limited to short term bruising and discomfort/pain)  \par The protocol of temp checks upon arrival ,disinfection procedure of waiting room and the lab explained- reassured. \par All questions answered. \par Patient instructed to book appointment upon conclusion of appointment\par \par Information sheet ' Answers to your Questions on EMG " forwarded to patient to read prior to testing, with further information about training,background and the procedure itself .\par Total clinician time on the date of this encounter was at least 45 minutes- including\par \par 1 preparing to see the patient and review of prior notes, tests and other records -ortho records \par 2 obtaining and reviewing and/ or confirming the history\par 3 performing a medically necessary, pertinent  and appropriate examination \par 4 ordering ,tests,procedures,\par 5 explaining the diagnosis or differential to the patient \par 6 communicating with other physicians or providers before during or after the visit.will call Dr Lees with results after the study \par \par The patient had the opportunity to ask questions and all were answered to their satisfaction. We will coordinate treatment with the other members of the treatment team.\par The patient verbalized understanding of the management/plan rationale and agreed with my recommendations.\par \par \par \par \par

## 2022-05-19 NOTE — CONSULT LETTER
[FreeTextEntry1] : Dear Dr. RAFFY BURT  , \par \par I had the pleasure of evaluating your patient, MYRNA NELSON .\par \par Thank you very much for allowing me to participate in the care of this patient. If you have any questions, please do not hesitate to contact me. \par \par Sincerely, \par Christina Kearney MD \par \par ABPMR Board Certified in Physical Medicine and Rehabilitation\par Certified Fellow of AANEM (Neuromuscular and Electrodiagnostic Medicine)\par Subspecialty certified in Sports Medicine (ABPMR)\par \par  of Physical Medicine and Rehabilitation\par [employer] [facility] [facility] [employer]\par [facility]\par \par

## 2022-05-25 ENCOUNTER — APPOINTMENT (OUTPATIENT)
Dept: INTERNAL MEDICINE | Facility: CLINIC | Age: 67
End: 2022-05-25
Payer: MEDICARE

## 2022-05-25 ENCOUNTER — APPOINTMENT (OUTPATIENT)
Dept: PHYSICAL MEDICINE AND REHAB | Facility: CLINIC | Age: 67
End: 2022-05-25
Payer: MEDICARE

## 2022-05-25 ENCOUNTER — TRANSCRIPTION ENCOUNTER (OUTPATIENT)
Age: 67
End: 2022-05-25

## 2022-05-25 VITALS
TEMPERATURE: 97.8 F | DIASTOLIC BLOOD PRESSURE: 70 MMHG | HEART RATE: 61 BPM | SYSTOLIC BLOOD PRESSURE: 106 MMHG | BODY MASS INDEX: 22.34 KG/M2 | OXYGEN SATURATION: 97 % | RESPIRATION RATE: 16 BRPM | WEIGHT: 139 LBS | HEIGHT: 66 IN

## 2022-05-25 DIAGNOSIS — R20.0 ANESTHESIA OF SKIN: ICD-10-CM

## 2022-05-25 DIAGNOSIS — M25.521 PAIN IN RIGHT ELBOW: ICD-10-CM

## 2022-05-25 DIAGNOSIS — G56.21 LESION OF ULNAR NERVE, RIGHT UPPER LIMB: ICD-10-CM

## 2022-05-25 DIAGNOSIS — R52 PAIN, UNSPECIFIED: ICD-10-CM

## 2022-05-25 PROCEDURE — 95913 NRV CNDJ TEST 13/> STUDIES: CPT

## 2022-05-25 PROCEDURE — 36415 COLL VENOUS BLD VENIPUNCTURE: CPT

## 2022-05-25 PROCEDURE — 95886 MUSC TEST DONE W/N TEST COMP: CPT

## 2022-05-25 PROCEDURE — G0438: CPT

## 2022-05-25 PROCEDURE — 93000 ELECTROCARDIOGRAM COMPLETE: CPT

## 2022-05-25 NOTE — PLAN
[FreeTextEntry1] : wellness complete\par labs today\par  ekg NSR\par  ? last dexa\par routine derm gyn\par to get colonoscopy\par EMG testing with Dr MASON

## 2022-05-25 NOTE — HISTORY OF PRESENT ILLNESS
[FreeTextEntry1] : wellness [de-identified] : This is a 65 yo f with hx of OA, GERD here for wellness\par \par - pain thought to be gastric - gerd.\par Drinks coffee\par she is currently taking omeprazole and sucralfate and the symptoms have abated.  \par h/o b/l THR\par Pescaterian  \par runs yoga, active\par uptodate with vacines and had a mild case of covid.\par 5 siblings   -4 with DM\par fish oil, mvi, caltrate and turmeric\par mammo and pelvic us performed this year\par dexa - 2-3 years?  \par Colonoscopy in 2 weeks.

## 2022-05-26 LAB
25(OH)D3 SERPL-MCNC: 59.1 NG/ML
ALBUMIN SERPL ELPH-MCNC: 4.6 G/DL
ALP BLD-CCNC: 53 U/L
ALT SERPL-CCNC: 22 U/L
ANION GAP SERPL CALC-SCNC: 13 MMOL/L
APPEARANCE: CLEAR
AST SERPL-CCNC: 24 U/L
BASOPHILS # BLD AUTO: 0.04 K/UL
BASOPHILS NFR BLD AUTO: 1 %
BILIRUB SERPL-MCNC: 0.4 MG/DL
BILIRUBIN URINE: NEGATIVE
BLOOD URINE: NEGATIVE
BUN SERPL-MCNC: 20 MG/DL
CALCIUM SERPL-MCNC: 9.9 MG/DL
CHLORIDE SERPL-SCNC: 104 MMOL/L
CHOLEST SERPL-MCNC: 235 MG/DL
CO2 SERPL-SCNC: 22 MMOL/L
COLOR: YELLOW
CREAT SERPL-MCNC: 0.8 MG/DL
EGFR: 80 ML/MIN/1.73M2
EOSINOPHIL # BLD AUTO: 0.14 K/UL
EOSINOPHIL NFR BLD AUTO: 3.4 %
ESTIMATED AVERAGE GLUCOSE: 126 MG/DL
GLUCOSE QUALITATIVE U: NEGATIVE
GLUCOSE SERPL-MCNC: 99 MG/DL
HBA1C MFR BLD HPLC: 6 %
HCT VFR BLD CALC: 43.3 %
HCV AB SER QL: NONREACTIVE
HCV S/CO RATIO: 0.2 S/CO
HDLC SERPL-MCNC: 99 MG/DL
HGB BLD-MCNC: 13.3 G/DL
IMM GRANULOCYTES NFR BLD AUTO: 0.2 %
KETONES URINE: NEGATIVE
LDLC SERPL CALC-MCNC: 116 MG/DL
LEUKOCYTE ESTERASE URINE: NEGATIVE
LYMPHOCYTES # BLD AUTO: 0.86 K/UL
LYMPHOCYTES NFR BLD AUTO: 20.8 %
MAN DIFF?: NORMAL
MCHC RBC-ENTMCNC: 30.7 GM/DL
MCHC RBC-ENTMCNC: 30.7 PG
MCV RBC AUTO: 100 FL
MONOCYTES # BLD AUTO: 0.29 K/UL
MONOCYTES NFR BLD AUTO: 7 %
NEUTROPHILS # BLD AUTO: 2.79 K/UL
NEUTROPHILS NFR BLD AUTO: 67.6 %
NITRITE URINE: NEGATIVE
NONHDLC SERPL-MCNC: 136 MG/DL
PH URINE: 5.5
PLATELET # BLD AUTO: 209 K/UL
POTASSIUM SERPL-SCNC: 4.9 MMOL/L
PROT SERPL-MCNC: 6.9 G/DL
PROTEIN URINE: NORMAL
RBC # BLD: 4.33 M/UL
RBC # FLD: 13.4 %
SODIUM SERPL-SCNC: 139 MMOL/L
SPECIFIC GRAVITY URINE: 1.03
TRIGL SERPL-MCNC: 101 MG/DL
TSH SERPL-ACNC: 2.29 UIU/ML
UROBILINOGEN URINE: NORMAL
VIT B12 SERPL-MCNC: 800 PG/ML
WBC # FLD AUTO: 4.13 K/UL

## 2022-05-31 ENCOUNTER — APPOINTMENT (OUTPATIENT)
Dept: ORTHOPEDIC SURGERY | Facility: CLINIC | Age: 67
End: 2022-05-31
Payer: MEDICARE

## 2022-05-31 PROCEDURE — 99442: CPT | Mod: 95

## 2022-06-08 VITALS — WEIGHT: 139 LBS | HEIGHT: 66 IN | BODY MASS INDEX: 22.34 KG/M2 | RESPIRATION RATE: 16 BRPM

## 2022-06-09 ENCOUNTER — APPOINTMENT (OUTPATIENT)
Dept: ORTHOPEDIC SURGERY | Facility: CLINIC | Age: 67
End: 2022-06-09
Payer: MEDICARE

## 2022-06-09 DIAGNOSIS — G56.21 LESION OF ULNAR NERVE, RIGHT UPPER LIMB: ICD-10-CM

## 2022-06-09 DIAGNOSIS — G56.03 CARPAL TUNNEL SYNDROM,BILATERAL UPPER LIMBS: ICD-10-CM

## 2022-06-09 PROCEDURE — 20526 THER INJECTION CARP TUNNEL: CPT | Mod: RT

## 2022-06-09 PROCEDURE — 99213 OFFICE O/P EST LOW 20 MIN: CPT | Mod: 25

## 2022-08-05 ENCOUNTER — NON-APPOINTMENT (OUTPATIENT)
Age: 67
End: 2022-08-05

## 2022-08-05 ENCOUNTER — APPOINTMENT (OUTPATIENT)
Dept: AFTER HOURS CARE | Facility: EMERGENCY ROOM | Age: 67
End: 2022-08-05

## 2022-08-05 DIAGNOSIS — R22.0 LOCALIZED SWELLING, MASS AND LUMP, HEAD: ICD-10-CM

## 2022-08-05 PROCEDURE — 99204 OFFICE O/P NEW MOD 45 MIN: CPT | Mod: 95

## 2022-08-05 NOTE — PLAN
[With new medications prescribed] : Treat in place: with new medications prescribed [FreeTextEntry1] : rx abx\par dentist and pmd follow up\par anticipatory guidance and precautions

## 2022-08-05 NOTE — ASSESSMENT
[FreeTextEntry1] : not c/w necrotising gingivitis but pt may be developing an intraoral infection. While keflex isnt the typical abx for\par dental and oral infection, pt st that she prefers it since it worked last time. precautions given

## 2022-08-05 NOTE — HISTORY OF PRESENT ILLNESS
[Home] : at home, [unfilled] , at the time of the visit. [Other Location: e.g. Home (Enter Location, City,State)___] : at [unfilled] [Verbal consent obtained from patient] : the patient, [unfilled] [FreeTextEntry8] : 67y F hx osteoperosis now p/w 3-4d swelling of gums, LAD, dry mouth, pain with brushing teeth.\par no fevers, halitosis, fatigue. No ocular or genital lesions/sx. Pt had this once before 2mo ago and got better with 7d\par keflex. Pt looking for abx prior to international travel in 2-3d. .

## 2023-02-07 NOTE — PATIENT PROFILE ADULT - MEDICATIONS/VISITS
Consent (Near Eyelid Margin)/Introductory Paragraph: The rationale for Mohs was explained to the patient and consent was obtained. The risks, benefits and alternatives to therapy were discussed in detail. Specifically, the risks of ectropion or eyelid deformity, infection, scarring, bleeding, prolonged wound healing, incomplete removal, allergy to anesthesia, nerve injury and recurrence were addressed. Prior to the procedure, the treatment site was clearly identified and confirmed by the patient. All components of Universal Protocol/PAUSE Rule completed. no

## 2023-03-19 ENCOUNTER — RX RENEWAL (OUTPATIENT)
Age: 68
End: 2023-03-19

## 2023-03-20 ENCOUNTER — TRANSCRIPTION ENCOUNTER (OUTPATIENT)
Age: 68
End: 2023-03-20

## 2023-07-11 ENCOUNTER — APPOINTMENT (OUTPATIENT)
Dept: INTERNAL MEDICINE | Facility: CLINIC | Age: 68
End: 2023-07-11
Payer: MEDICARE

## 2023-07-11 VITALS
TEMPERATURE: 98 F | DIASTOLIC BLOOD PRESSURE: 78 MMHG | HEART RATE: 70 BPM | BODY MASS INDEX: 22.34 KG/M2 | SYSTOLIC BLOOD PRESSURE: 111 MMHG | RESPIRATION RATE: 16 BRPM | WEIGHT: 139 LBS | OXYGEN SATURATION: 97 % | HEIGHT: 66 IN

## 2023-07-11 DIAGNOSIS — Z00.00 ENCOUNTER FOR GENERAL ADULT MEDICAL EXAMINATION W/OUT ABNORMAL FINDINGS: ICD-10-CM

## 2023-07-11 DIAGNOSIS — E55.9 VITAMIN D DEFICIENCY, UNSPECIFIED: ICD-10-CM

## 2023-07-11 PROCEDURE — G0439: CPT

## 2023-07-11 PROCEDURE — 36415 COLL VENOUS BLD VENIPUNCTURE: CPT

## 2023-07-11 NOTE — PLAN
[FreeTextEntry1] : wellness complete\par labs today\par \par new gyn referral\par  HCM uptodate\par \par diamox for travel

## 2023-07-11 NOTE — HISTORY OF PRESENT ILLNESS
[FreeTextEntry1] : wellness [de-identified] : This is a 69 yo f with hx of OA, GERD here for wellness\par  \par Overall feeling well.  Exercises a lot-  running 3 miles 5-6 daysa week.\par sees derm\par  dexa has been normal. \par will need a new gyn.  \par h/o b/l THR\par Pescaterian  \par runs yoga, active\par uptodate with vacines and had a mild case of covid.\par 5 siblings   -4 with DM\par fish oil, mvi, caltrate and turmeric\par mammo and pelvic us routinely.  \par colonoscopy 2022. \par dexa - 2-3 years?  \par Colonoscopy in 2 weeks.

## 2023-07-12 LAB
25(OH)D3 SERPL-MCNC: 71.5 NG/ML
ALBUMIN SERPL ELPH-MCNC: 4.8 G/DL
ALP BLD-CCNC: 56 U/L
ALT SERPL-CCNC: 25 U/L
ANION GAP SERPL CALC-SCNC: 14 MMOL/L
APPEARANCE: CLEAR
AST SERPL-CCNC: 31 U/L
BILIRUB SERPL-MCNC: 0.6 MG/DL
BILIRUBIN URINE: ABNORMAL
BLOOD URINE: NEGATIVE
BUN SERPL-MCNC: 23 MG/DL
CALCIUM SERPL-MCNC: 10.6 MG/DL
CHLORIDE SERPL-SCNC: 103 MMOL/L
CHOLEST SERPL-MCNC: 261 MG/DL
CO2 SERPL-SCNC: 25 MMOL/L
COLOR: NORMAL
CREAT SERPL-MCNC: 0.93 MG/DL
EGFR: 66 ML/MIN/1.73M2
ESTIMATED AVERAGE GLUCOSE: 117 MG/DL
GLUCOSE QUALITATIVE U: NEGATIVE MG/DL
GLUCOSE SERPL-MCNC: 91 MG/DL
HBA1C MFR BLD HPLC: 5.7 %
HDLC SERPL-MCNC: 111 MG/DL
KETONES URINE: 15 MG/DL
LDLC SERPL CALC-MCNC: 139 MG/DL
LEUKOCYTE ESTERASE URINE: ABNORMAL
NITRITE URINE: NEGATIVE
NONHDLC SERPL-MCNC: 150 MG/DL
PH URINE: 5.5
POTASSIUM SERPL-SCNC: 5 MMOL/L
PROT SERPL-MCNC: 7.3 G/DL
PROTEIN URINE: NORMAL MG/DL
SODIUM SERPL-SCNC: 142 MMOL/L
SPECIFIC GRAVITY URINE: 1.03
TRIGL SERPL-MCNC: 71 MG/DL
TSH SERPL-ACNC: 1.52 UIU/ML
UROBILINOGEN URINE: 0.2 MG/DL

## 2024-01-01 ENCOUNTER — RX RENEWAL (OUTPATIENT)
Age: 69
End: 2024-01-01

## 2024-01-02 NOTE — ED ADULT NURSE NOTE - NSFALLRSKINDICATORS_ED_ALL_ED
Pt unable to void since arrival to unit even with purewick in place. Bladder scan with 555cc. Dr. Valencia made aware and gave orders for in/out cath. 500cc clear yellow urine drained on in/out cath. Sterile protocol maintained. POC ongoing.   no

## 2024-02-08 ENCOUNTER — OUTPATIENT (OUTPATIENT)
Dept: OUTPATIENT SERVICES | Facility: HOSPITAL | Age: 69
LOS: 1 days | End: 2024-02-08
Payer: MEDICARE

## 2024-02-08 ENCOUNTER — RESULT REVIEW (OUTPATIENT)
Age: 69
End: 2024-02-08

## 2024-02-08 ENCOUNTER — APPOINTMENT (OUTPATIENT)
Dept: ORTHOPEDIC SURGERY | Facility: CLINIC | Age: 69
End: 2024-02-08
Payer: MEDICARE

## 2024-02-08 VITALS
TEMPERATURE: 97.9 F | BODY MASS INDEX: 22.34 KG/M2 | DIASTOLIC BLOOD PRESSURE: 68 MMHG | SYSTOLIC BLOOD PRESSURE: 111 MMHG | WEIGHT: 139 LBS | HEART RATE: 79 BPM | HEIGHT: 66 IN | OXYGEN SATURATION: 97 %

## 2024-02-08 DIAGNOSIS — Z98.890 OTHER SPECIFIED POSTPROCEDURAL STATES: Chronic | ICD-10-CM

## 2024-02-08 DIAGNOSIS — Z98.89 OTHER SPECIFIED POSTPROCEDURAL STATES: Chronic | ICD-10-CM

## 2024-02-08 DIAGNOSIS — Z96.641 PRESENCE OF RIGHT ARTIFICIAL HIP JOINT: Chronic | ICD-10-CM

## 2024-02-08 DIAGNOSIS — M25.461 EFFUSION, RIGHT KNEE: ICD-10-CM

## 2024-02-08 PROCEDURE — 73564 X-RAY EXAM KNEE 4 OR MORE: CPT

## 2024-02-08 PROCEDURE — 73564 X-RAY EXAM KNEE 4 OR MORE: CPT | Mod: 26,50

## 2024-02-08 PROCEDURE — 99203 OFFICE O/P NEW LOW 30 MIN: CPT

## 2024-02-08 RX ORDER — CEPHALEXIN 250 MG/1
250 CAPSULE ORAL 3 TIMES DAILY
Qty: 21 | Refills: 0 | Status: COMPLETED | COMMUNITY
Start: 2022-08-05 | End: 2024-02-08

## 2024-02-09 NOTE — HISTORY OF PRESENT ILLNESS
[de-identified] : Yulia Birch is an active 69 yo woman who presents with ongoing anterior right knee pain for the last several months. She normally runs 3 miles per day several days per week without issue. In December she noted some discomfort after a run. She took some time off from running as she went on an extended vacation where she did a lot of walking. Her knee pain increased. She has anterior knee pain and stiffness. She has occasional swelling. She has pain with down hills and stairs. She tried elliptical without discomfort She has been taking Advil without much relief.

## 2024-02-09 NOTE — DISCUSSION/SUMMARY
[de-identified] : Yulia has had ongoing anterior knee pain for the last three months. She will be sent for an MRI to r.o chondral injury vs meniscal tear. we will call her with the results. she will continue on with activities as tolerated. We will see her back on an as needed basis. She love call if any issues arise

## 2024-02-09 NOTE — END OF VISIT
[FreeTextEntry3] : All medical record entries made by RUSS Peterson, acting as a scribe for this encounter under the direction of Garret Mendez MD . I have reviewed the chart and agree that the record accurately reflects my personal performance of the history, physical exam, assessment and plan. I have also personally directed, reviewed, and agreed with the chart.

## 2024-02-12 ENCOUNTER — APPOINTMENT (OUTPATIENT)
Dept: MRI IMAGING | Facility: CLINIC | Age: 69
End: 2024-02-12
Payer: MEDICARE

## 2024-02-12 PROCEDURE — 73721 MRI JNT OF LWR EXTRE W/O DYE: CPT | Mod: RT

## 2024-02-21 NOTE — PROGRESS NOTE ADULT - SUBJECTIVE AND OBJECTIVE BOX
02/21/24 1002   Required for all Hemodialysis Patients   Hepatitis Status other (see comments)   Handoff Report   Received From Katharine Miner RN   Given To Kwesi White RN   Treatment Type   Treatment Type Acute   Vital Signs   Temp 99.1 °F (37.3 °C)   Temp Source Axillary   Pulse 86   Heart Rate Source Monitor   Resp 20   SpO2 98 %   Pulse Oximetry Type Intermittent   Probe Placed On (Pulse Ox) Right:;finger   Device (Oxygen Therapy) room air   BP 98/63   MAP (mmHg) 76   BP Location Right arm   BP Method Automatic   Patient Position Lying     Arrived at the bedside, will prepare for dialysis   Infectious Disease Progress Note       INTERVAL HPI/OVERNIGHT EVENTS:  Patient was seen and examined at bedside. As per nurse and patient, no o/n events, patient resting comfortably. No complaints at this time. Patient denies: fever, chills, dizziness, weakness, HA, Changes in vision, CP, palpitations, SOB, cough, N/V/D/C, dysuria, changes in bowel movements, LE edema. ROS otherwise negative.    VITAL SIGNS:  T(F): 98.9 (03-16-20 @ 18:06)  HR: 59 (03-16-20 @ 18:06)  BP: 106/66 (03-16-20 @ 18:06)  RR: 16 (03-16-20 @ 18:06)  SpO2: 94% (03-16-20 @ 18:06)  Wt(kg): --    PHYSICAL EXAM:    Constitutional: WDWN, NAD  HEENT: PERRL, EOMI, sclera non-icteric, neck supple, trachea midline, no masses, no JVD, MMM, good dentition  Respiratory: CTA b/l, good air entry b/l, no wheezing, no rhonchi, no rales, without accessory muscle use and no intercostal retractions  Cardiovascular: RRR, normal S1S2, no M/R/G  Gastrointestinal: soft, NTND, no masses palpable, BS normal  Extremities: Warm, well perfused, pulses equal bilateral upper and lower extremities, no edema, no clubbing  Neurological: AAOx3, CN Grossly intact  Skin: Normal temperature, warm, dry    MEDICATIONS  (STANDING):  aspirin enteric coated 81 milliGRAM(s) Oral every 24 hours  cefTRIAXone   IVPB 2000 milliGRAM(s) IV Intermittent every 24 hours    MEDICATIONS  (PRN):  acetaminophen   Tablet .. 650 milliGRAM(s) Oral every 6 hours PRN Temp greater or equal to 38C (100.4F), Moderate Pain (4 - 6), Severe Pain (7 - 10)  benzonatate 100 milliGRAM(s) Oral three times a day PRN Cough      Allergies    No Known Allergies    Intolerances        LABS:                        12.6   6.11  )-----------( 265      ( 16 Mar 2020 05:46 )             38.2     03-16    140  |  105  |  11  ----------------------------<  126<H>  4.8   |  26  |  0.68    Ca    9.5      16 Mar 2020 05:46  Mg     2.1     03-16        Urinalysis Basic - ( 15 Mar 2020 08:48 )    Color: Yellow / Appearance: Clear / SG: <=1.005 / pH: x  Gluc: x / Ketone: NEGATIVE  / Bili: Negative / Urobili: 0.2 E.U./dL   Blood: x / Protein: NEGATIVE mg/dL / Nitrite: NEGATIVE   Leuk Esterase: Large / RBC: 5-10 /HPF / WBC Many /HPF   Sq Epi: x / Non Sq Epi: 0-5 /HPF / Bacteria: Present /HPF        Culture Results:   Growth in aerobic and anaerobic bottles: Escherichia coli  Susceptibility to follow. (03.14.20 @ 20:28)      RADIOLOGY & ADDITIONAL TESTS:  Reviewe< from: CT Abdomen and Pelvis w/ Oral Cont and w/ IV Cont (03.16.20 @ 14:33) >  FINDINGS: CT of the abdomen and pelvis was performed with the administration of intravenous contrast. Reconstructions were performed in the sagittal and coronal planes. No prior studies are available for comparison.    Evaluation of the lower chest demonstrates normal heart size. No pleural and no pericardial effusions. Lower lung parenchyma is unremarkable. Evaluation of the abdomen demonstrates that the liver, pancreas, bilateral adrenal glands and bilateral kidneys are unremarkable aside from small bilateral renal cysts. There are multiple nodules throughout the splenic parenchyma, largest centrally measuring 1.8 cm. Evaluation of the gastrointestinal tract is negative for bowel thickening or bowel obstruction. There is normal appearance of the appendix. Evaluation of the pelvis demonstrates the uterus, adnexal regions and bladder are unremarkable. Limited evaluation of the pelvis secondary to streak artifact from bilateral total hip arthroplasty. No free fluid. No adenopathy. No significant arterial vascular calcification. The opacified aspects of the hepatic, portal, splenic, superior mesenteric vein, bilateral renal veins and bilateral iliac veins demonstrates no china intraluminal thrombus. Evaluation of the osseous structures demonstrates no destructive lesion with a benign-appearing sclerotic nodule within the right iliac bone. There is a nondisplaced anterolateral right rib fracture.          IMPRESSION:    Nonspecific splenic findings, consistent with benign or malignant etiologies, including: lymphoma, sarcoidosis or multiple abscesses.    < end of copied text >

## 2024-03-18 ENCOUNTER — RX RENEWAL (OUTPATIENT)
Age: 69
End: 2024-03-18

## 2024-03-19 ENCOUNTER — APPOINTMENT (OUTPATIENT)
Dept: ORTHOPEDIC SURGERY | Facility: CLINIC | Age: 69
End: 2024-03-19
Payer: MEDICARE

## 2024-03-19 VITALS
TEMPERATURE: 97.6 F | DIASTOLIC BLOOD PRESSURE: 76 MMHG | OXYGEN SATURATION: 98 % | BODY MASS INDEX: 22.34 KG/M2 | WEIGHT: 139 LBS | HEIGHT: 66 IN | SYSTOLIC BLOOD PRESSURE: 117 MMHG | HEART RATE: 62 BPM

## 2024-03-19 DIAGNOSIS — M17.11 UNILATERAL PRIMARY OSTEOARTHRITIS, RIGHT KNEE: ICD-10-CM

## 2024-03-19 PROCEDURE — 99213 OFFICE O/P EST LOW 20 MIN: CPT | Mod: 25

## 2024-03-19 PROCEDURE — 20610 DRAIN/INJ JOINT/BURSA W/O US: CPT | Mod: RT

## 2024-03-20 PROBLEM — M17.11 PRIMARY LOCALIZED OSTEOARTHRITIS OF RIGHT KNEE: Status: ACTIVE | Noted: 2024-03-20

## 2024-03-20 NOTE — HISTORY OF PRESENT ILLNESS
[de-identified] : Yulia returns today for evaluation of her right knee. She had an MRI which shows DJD and degenerative tears of her meniscii. She has had ongoing stiffness and pain in her right knee. She has been using the elliptical but has been unable to run. She denies any locking or buckling

## 2024-03-20 NOTE — PHYSICAL EXAM
[de-identified] : The patient is a well developed, well nourished female in no apparent distress. She is alert and oriented X 3 with a pleasant mood and appropriate affect.     On physical examination of the right knee, her ROM is 0-120 degrees. The patient walks with a normal gait and stands in neutral alignment. There is trace effusion. No warmth or erythema is noted. The patella is tender to palpation medially and laterally. There is patellofemoral crepitus noted. The apprehension and grind tests are negative. The extensor mechanism is intact. There is no joint line tenderness. The Juan sign is absent. The Lachman and pivot shift tests are negative. There is no varus or valgus laxity at 0 or 30 degrees. No posterolateral or anteromedial laxity is noted. No masses are palpable. No other soft tissue or bony tenderness is noted. There is some tenderness noted on palpation of the IT band. Quadriceps weakness is noted. Neurovascular function is intact.     [de-identified] : MRI of the right knee shows moderate tricompartment DJD and degenerative tears of both meniscii

## 2024-03-20 NOTE — DISCUSSION/SUMMARY
[de-identified] : Yulia has symptomatic DJD in her right knee. She received a cortisone injection today. She will continue on with activities as tolerated. She will return on an as needed basis. She will call if any issues arise

## 2024-03-20 NOTE — PROCEDURE
[de-identified] : Under strict sterile technique, the right knee was prepped with Chloraprep. Using the superolateral approach, with the patient supine, 1ml of Kenalog was mixed with 5mls of 1% Lidocaine and 5mLs of 0.5% Marcaine, and was injected intraarticularly. The patient tolerated the procedure well. The patient was instructed to avoid vigorous exercise for 24 hours and will apply ice to the knee for 20 minutes 2-3 times per day if discomfort occurs. Patient will return on an as needed basis. The patient will call if any questions or problems should arise

## 2024-04-03 ENCOUNTER — APPOINTMENT (OUTPATIENT)
Dept: ENDOCRINOLOGY | Facility: CLINIC | Age: 69
End: 2024-04-03
Payer: MEDICARE

## 2024-04-03 ENCOUNTER — APPOINTMENT (OUTPATIENT)
Dept: NEUROLOGY | Facility: CLINIC | Age: 69
End: 2024-04-03
Payer: MEDICARE

## 2024-04-03 VITALS
OXYGEN SATURATION: 97 % | DIASTOLIC BLOOD PRESSURE: 73 MMHG | TEMPERATURE: 96.8 F | HEIGHT: 66 IN | HEART RATE: 63 BPM | WEIGHT: 138 LBS | BODY MASS INDEX: 22.18 KG/M2 | SYSTOLIC BLOOD PRESSURE: 109 MMHG

## 2024-04-03 VITALS
HEART RATE: 60 BPM | HEIGHT: 66 IN | WEIGHT: 138 LBS | DIASTOLIC BLOOD PRESSURE: 77 MMHG | BODY MASS INDEX: 22.18 KG/M2 | SYSTOLIC BLOOD PRESSURE: 123 MMHG

## 2024-04-03 DIAGNOSIS — S06.9XAA UNSPECIFIED INTRACRANIAL INJURY WITH LOSS OF CONSCIOUSNESS STATUS UNKNOWN, INITIAL ENCOUNTER: ICD-10-CM

## 2024-04-03 DIAGNOSIS — R63.5 ABNORMAL WEIGHT GAIN: ICD-10-CM

## 2024-04-03 DIAGNOSIS — Z13.0 ENCOUNTER FOR SCREENING FOR DISEASES OF THE BLOOD AND BLOOD-FORMING ORGANS AND CERTAIN DISORDERS INVOLVING THE IMMUNE MECHANISM: ICD-10-CM

## 2024-04-03 DIAGNOSIS — R73.03 PREDIABETES.: ICD-10-CM

## 2024-04-03 DIAGNOSIS — Z13.29 ENCOUNTER FOR SCREENING FOR OTHER SUSPECTED ENDOCRINE DISORDER: ICD-10-CM

## 2024-04-03 DIAGNOSIS — S06.9X1D UNSPECIFIED INTRACRANIAL INJURY WITH LOSS OF CONSCIOUSNESS OF 30 MINUTES OR LESS, SUBSEQUENT ENCOUNTER: ICD-10-CM

## 2024-04-03 LAB — GLUCOSE BLDC GLUCOMTR-MCNC: 87

## 2024-04-03 PROCEDURE — 83036 HEMOGLOBIN GLYCOSYLATED A1C: CPT | Mod: QW

## 2024-04-03 PROCEDURE — 99205 OFFICE O/P NEW HI 60 MIN: CPT

## 2024-04-03 PROCEDURE — 99204 OFFICE O/P NEW MOD 45 MIN: CPT

## 2024-04-03 PROCEDURE — 82962 GLUCOSE BLOOD TEST: CPT

## 2024-04-03 RX ORDER — ACETAZOLAMIDE 125 MG/1
125 TABLET ORAL
Qty: 16 | Refills: 0 | Status: DISCONTINUED | COMMUNITY
Start: 2022-08-23 | End: 2024-04-03

## 2024-04-03 NOTE — ADDENDUM
[FreeTextEntry1] : This time included review of previous records,  lab.  data, discussing findings, differential diagnosis, further testing and evaluation, therapeutic options, renewing medications, completing the record.

## 2024-04-03 NOTE — HISTORY OF PRESENT ILLNESS
[FreeTextEntry1] : 68 y.o. retired  who had gained about 10 lb over time and developed prediabetes. She is physically active and feels well. Glucose today is 87 mg/dl, HbA1C - 5.9%.

## 2024-04-03 NOTE — PHYSICAL EXAM
[No Acute Distress] : no acute distress [Alert] : alert [Well Nourished] : well nourished [Well Developed] : well developed [Normal Sclera/Conjunctiva] : normal sclera/conjunctiva [EOMI] : extra ocular movement intact [No Proptosis] : no proptosis [Normal Oropharynx] : the oropharynx was normal [Thyroid Not Enlarged] : the thyroid was not enlarged [No Thyroid Nodules] : no palpable thyroid nodules [No Respiratory Distress] : no respiratory distress [No Accessory Muscle Use] : no accessory muscle use [Clear to Auscultation] : lungs were clear to auscultation bilaterally [Normal S1, S2] : normal S1 and S2 [Normal Rate] : heart rate was normal [Regular Rhythm] : with a regular rhythm [No Edema] : no peripheral edema [Pedal Pulses Normal] : the pedal pulses are present [Normal Bowel Sounds] : normal bowel sounds [Not Tender] : non-tender [Not Distended] : not distended [Soft] : abdomen soft [Normal Anterior Cervical Nodes] : no anterior cervical lymphadenopathy [Normal Posterior Cervical Nodes] : no posterior cervical lymphadenopathy [No Spinal Tenderness] : no spinal tenderness [Spine Straight] : spine straight [No Stigmata of Cushings Syndrome] : no stigmata of Cushings Syndrome [Normal Gait] : normal gait [Normal Strength/Tone] : muscle strength and tone were normal [No Rash] : no rash [Acanthosis Nigricans] : no acanthosis nigricans [Normal Reflexes] : deep tendon reflexes were 2+ and symmetric [Oriented x3] : oriented to person, place, and time [No Tremors] : no tremors

## 2024-04-03 NOTE — ASSESSMENT
[FreeTextEntry1] : Prediabetes Weight gain Hyperlipidemia  Discussed the use of GLP1/GIP agonists Side effects discussed The device demonstrated Medications prescribed Lab.tests today Nutrition consultation/body composition F/U once tyler above results are available

## 2024-04-03 NOTE — REVIEW OF SYSTEMS
[As Noted in HPI] : as noted in HPI [Recent Weight Gain (___ Lbs)] : recent weight gain: [unfilled] lbs [Negative] : Psychiatric

## 2024-04-04 ENCOUNTER — NON-APPOINTMENT (OUTPATIENT)
Age: 69
End: 2024-04-04

## 2024-04-04 LAB
ALBUMIN SERPL ELPH-MCNC: 4.7 G/DL
ALP BLD-CCNC: 53 U/L
ALT SERPL-CCNC: 22 U/L
ANION GAP SERPL CALC-SCNC: 11 MMOL/L
AST SERPL-CCNC: 21 U/L
BASOPHILS # BLD AUTO: 0.03 K/UL
BASOPHILS NFR BLD AUTO: 0.5 %
BILIRUB SERPL-MCNC: 0.2 MG/DL
BUN SERPL-MCNC: 32 MG/DL
CALCIUM SERPL-MCNC: 10.2 MG/DL
CHLORIDE SERPL-SCNC: 107 MMOL/L
CHOLEST SERPL-MCNC: 240 MG/DL
CO2 SERPL-SCNC: 26 MMOL/L
CREAT SERPL-MCNC: 0.82 MG/DL
CREAT SPEC-SCNC: 204 MG/DL
EGFR: 77 ML/MIN/1.73M2
EOSINOPHIL # BLD AUTO: 0.11 K/UL
EOSINOPHIL NFR BLD AUTO: 1.7 %
GLUCOSE SERPL-MCNC: 97 MG/DL
HCT VFR BLD CALC: 42.3 %
HDLC SERPL-MCNC: 89 MG/DL
HGB BLD-MCNC: 13.6 G/DL
IMM GRANULOCYTES NFR BLD AUTO: 0.3 %
LDLC SERPL CALC-MCNC: 135 MG/DL
LYMPHOCYTES # BLD AUTO: 1.3 K/UL
LYMPHOCYTES NFR BLD AUTO: 20.1 %
MAN DIFF?: NORMAL
MCHC RBC-ENTMCNC: 30.8 PG
MCHC RBC-ENTMCNC: 32.2 GM/DL
MCV RBC AUTO: 95.7 FL
MICROALBUMIN 24H UR DL<=1MG/L-MCNC: 3.1 MG/DL
MICROALBUMIN/CREAT 24H UR-RTO: 15 MG/G
MONOCYTES # BLD AUTO: 0.37 K/UL
MONOCYTES NFR BLD AUTO: 5.7 %
NEUTROPHILS # BLD AUTO: 4.65 K/UL
NEUTROPHILS NFR BLD AUTO: 71.7 %
NONHDLC SERPL-MCNC: 151 MG/DL
PLATELET # BLD AUTO: 257 K/UL
POTASSIUM SERPL-SCNC: 5.8 MMOL/L
PROT SERPL-MCNC: 7.3 G/DL
RBC # BLD: 4.42 M/UL
RBC # FLD: 13.2 %
SODIUM SERPL-SCNC: 145 MMOL/L
T3 SERPL-MCNC: 78 NG/DL
T4 FREE SERPL-MCNC: 1.4 NG/DL
TRIGL SERPL-MCNC: 93 MG/DL
TSH SERPL-ACNC: 1.33 UIU/ML
WBC # FLD AUTO: 6.48 K/UL

## 2024-04-08 LAB
ESTIMATED AVERAGE GLUCOSE: 120 MG/DL
HBA1C MFR BLD HPLC: 5.8 %

## 2024-04-09 ENCOUNTER — TRANSCRIPTION ENCOUNTER (OUTPATIENT)
Age: 69
End: 2024-04-09

## 2024-04-10 ENCOUNTER — APPOINTMENT (OUTPATIENT)
Dept: ENDOCRINOLOGY | Facility: CLINIC | Age: 69
End: 2024-04-10
Payer: MEDICARE

## 2024-04-10 ENCOUNTER — APPOINTMENT (OUTPATIENT)
Dept: MRI IMAGING | Facility: CLINIC | Age: 69
End: 2024-04-10
Payer: MEDICARE

## 2024-04-10 ENCOUNTER — OUTPATIENT (OUTPATIENT)
Dept: OUTPATIENT SERVICES | Facility: HOSPITAL | Age: 69
LOS: 1 days | End: 2024-04-10

## 2024-04-10 DIAGNOSIS — Z98.89 OTHER SPECIFIED POSTPROCEDURAL STATES: Chronic | ICD-10-CM

## 2024-04-10 DIAGNOSIS — Z98.890 OTHER SPECIFIED POSTPROCEDURAL STATES: Chronic | ICD-10-CM

## 2024-04-10 DIAGNOSIS — Z96.641 PRESENCE OF RIGHT ARTIFICIAL HIP JOINT: Chronic | ICD-10-CM

## 2024-04-10 PROCEDURE — 97802 MEDICAL NUTRITION INDIV IN: CPT

## 2024-04-10 PROCEDURE — 70551 MRI BRAIN STEM W/O DYE: CPT | Mod: 26,MH

## 2024-04-10 PROCEDURE — 70544 MR ANGIOGRAPHY HEAD W/O DYE: CPT | Mod: 26,59,MH

## 2024-04-10 PROCEDURE — 70547 MR ANGIOGRAPHY NECK W/O DYE: CPT | Mod: 26,MH

## 2024-04-11 NOTE — H&P ADULT - NSHPOUTPATIENTPROVIDERS_GEN_ALL_CORE
- see 4/9 TE, pt concerns addressed and pt will keep appt for 4/18  
From: [facility]  To: Srinivasa Georges  Sent: 4/8/2024 3:10 PM CDT  Subject: Pain from kidney stone procedure     Hi  I spoke with a nurse this morning about pain still in the area from the kidney and she set up an appt., next Thursday, the 18th I think for stint removal.     Is there ever a chance the stint could come out sooner which may help with pain relief maybe?, unless it has to stay in there so long?, me not being a doctor of course.   Would this Friday, the 13th be too soon? bc we’re out of town 14-16.    I’m just asking. Sorry.     Thanks  Respectfully   Betsy  
Christine Garcia

## 2024-04-12 DIAGNOSIS — I67.89 OTHER CEREBROVASCULAR DISEASE: ICD-10-CM

## 2024-04-12 RX ORDER — ROSUVASTATIN CALCIUM 20 MG/1
20 TABLET, FILM COATED ORAL DAILY
Qty: 90 | Refills: 3 | Status: ACTIVE | COMMUNITY
Start: 2024-04-12 | End: 1900-01-01

## 2024-04-12 NOTE — ASSESSMENT
[FreeTextEntry1] : Patient with multiple falls and had two falls while skiing with significant loss of memory of the fall and immediately post fall.  patient had a negative CT head.  Will get a follow up MRI brain but also get MRA head and neck because of previous events.  Consider EEG after brain scans.

## 2024-04-12 NOTE — PHYSICAL EXAM
[FreeTextEntry1] : The patient is alert and oriented x3, naming intact x3, repetition normal, follows three-step commands, and is able to participate fully in the history taking. Speech is normal with no evidence of dysarthria. Memory is intact: Immediate recall 3 out of 3, short-term 3 out of 3, remote memory intact Cranial nerves II through XII intact Motor exam: Upper and lower extremities 5 out of 5 power, normal tone. No abnormal movements noted. Sensory exam: Intact to light touch and pinprick. Romberg negative. Coordination and vestibular exam: Finger to nose intact, no evidence of truncal or appendicular ataxia. No evidence of nystagmus. no vestibular symptoms elicited with head turning during ambulation. Gait: Normal stance and gait. Reflexes: One to 2+ in upper and lower extremities. No pathological reflexes. Downgoing toes. HEENT: Fading ecchymosis from fall.

## 2024-04-12 NOTE — HISTORY OF PRESENT ILLNESS
[FreeTextEntry1] : CC; Concussion Fell while sking. Did not remember the fall and the cara few minutes of getting up and taking her skis off.  Got taken by sled down to the local hopital and had 2 ZCT scans 6 hours apart that were negative.

## 2024-04-30 ENCOUNTER — TRANSCRIPTION ENCOUNTER (OUTPATIENT)
Age: 69
End: 2024-04-30

## 2024-05-01 ENCOUNTER — TRANSCRIPTION ENCOUNTER (OUTPATIENT)
Age: 69
End: 2024-05-01

## 2024-05-01 RX ORDER — TIRZEPATIDE 5 MG/.5ML
5 INJECTION, SOLUTION SUBCUTANEOUS
Qty: 4 | Refills: 3 | Status: ACTIVE | COMMUNITY
Start: 2024-04-03 | End: 1900-01-01

## 2024-05-10 ENCOUNTER — APPOINTMENT (OUTPATIENT)
Dept: ENDOCRINOLOGY | Facility: CLINIC | Age: 69
End: 2024-05-10
Payer: MEDICARE

## 2024-05-10 VITALS — WEIGHT: 131.5 LBS | HEIGHT: 66 IN | BODY MASS INDEX: 21.13 KG/M2

## 2024-05-10 PROCEDURE — 97803 MED NUTRITION INDIV SUBSEQ: CPT

## 2024-05-23 ENCOUNTER — APPOINTMENT (OUTPATIENT)
Dept: HEART AND VASCULAR | Facility: CLINIC | Age: 69
End: 2024-05-23
Payer: MEDICARE

## 2024-05-23 ENCOUNTER — NON-APPOINTMENT (OUTPATIENT)
Age: 69
End: 2024-05-23

## 2024-05-23 VITALS
OXYGEN SATURATION: 97 % | DIASTOLIC BLOOD PRESSURE: 68 MMHG | BODY MASS INDEX: 21.38 KG/M2 | HEART RATE: 65 BPM | TEMPERATURE: 98 F | WEIGHT: 133 LBS | HEIGHT: 66 IN | SYSTOLIC BLOOD PRESSURE: 105 MMHG

## 2024-05-23 DIAGNOSIS — E78.5 HYPERLIPIDEMIA, UNSPECIFIED: ICD-10-CM

## 2024-05-23 PROCEDURE — 93000 ELECTROCARDIOGRAM COMPLETE: CPT

## 2024-05-23 PROCEDURE — 36415 COLL VENOUS BLD VENIPUNCTURE: CPT

## 2024-05-23 PROCEDURE — 99205 OFFICE O/P NEW HI 60 MIN: CPT

## 2024-05-23 NOTE — PHYSICAL EXAM
[Well Developed] : well developed [Well Nourished] : well nourished [No Acute Distress] : no acute distress [Normal Conjunctiva] : normal conjunctiva [Normal Venous Pressure] : normal venous pressure [No Carotid Bruit] : no carotid bruit [Normal S1, S2] : normal S1, S2 [No Rub] : no rub [No Gallop] : no gallop [Clear Lung Fields] : clear lung fields [Good Air Entry] : good air entry [No Respiratory Distress] : no respiratory distress  [Soft] : abdomen soft [Non Tender] : non-tender [No Masses/organomegaly] : no masses/organomegaly [Normal Bowel Sounds] : normal bowel sounds [Normal Gait] : normal gait [No Edema] : no edema [No Cyanosis] : no cyanosis [No Clubbing] : no clubbing [No Varicosities] : no varicosities [No Rash] : no rash [No Skin Lesions] : no skin lesions [Moves all extremities] : moves all extremities [No Focal Deficits] : no focal deficits [Normal Speech] : normal speech [Alert and Oriented] : alert and oriented [Normal memory] : normal memory [de-identified] : 2/6 nora rodriguez

## 2024-05-23 NOTE — HISTORY OF PRESENT ILLNESS
[FreeTextEntry1] : 67 y/o female with h/o predm, hl who presents for initial evaluation today after syncope    no cp, sob, palpitations, lh, edema, orthopnea, pnd, orthopnea, pnd, fatigue  seen by endo seen by neuro - was skiing in 3/24, had LOC event - fell and hit head - noted small SAH on initial imaging started on statin for cerebral microvascular disease  MRI/MRA brain : IMPRESSION: 1.  RIGHT CAROTID NECK CIRCULATION:   Intact. 2.  LEFT CAROTID NECK CIRCULATION:    Intact. 3.  VERTEBRAL NECK CIRCULATION:   Intact 4.  ANTERIOR INTRACRANIAL CIRCULATION:     Intact. 5.  POSTERIOR INTRACRANIAL CIRCULATION:   Intact. 6.  BRAIN:    No evidence of acute infarction. Cerebral hemisphere white matter lesions typical for ischemic white matter disease, in the upper range typical for age. Diffuse brain volume loss lower range typical for age. exercises   told she had mild honey - not using oral appliance  runs 3 miles 4-5 days/week no pregnancy complications diet healthy no mental health issues low stress  sleep 7 hours    PMH/PSH: cerebral microvascular disease gerd low vit D predm s/p bilateral THR c section hl   ALL: nkda  MEDS: mounjaro crestor 20 mg qhs fish oil vit D tumeric caltrate  mvi  SH: no tobacco social etoh no drugs  4 children - healthy from [place], lived [place] since   - retired  FH: mother -  CVA 78' father - alive, 98  5 siblings - brother  -etoh/drug abuse, 4 siblings - (3 with DM)

## 2024-05-26 LAB
ANION GAP SERPL CALC-SCNC: 13 MMOL/L
BUN SERPL-MCNC: 37 MG/DL
CALCIUM SERPL-MCNC: 10.7 MG/DL
CHLORIDE SERPL-SCNC: 104 MMOL/L
CHOLEST SERPL-MCNC: 148 MG/DL
CO2 SERPL-SCNC: 24 MMOL/L
CREAT SERPL-MCNC: 1.12 MG/DL
EGFR: 53 ML/MIN/1.73M2
ESTIMATED AVERAGE GLUCOSE: 114 MG/DL
GLUCOSE SERPL-MCNC: 111 MG/DL
HBA1C MFR BLD HPLC: 5.6 %
HDLC SERPL-MCNC: 73 MG/DL
LDLC SERPL CALC-MCNC: 62 MG/DL
MAGNESIUM SERPL-MCNC: 2.3 MG/DL
NONHDLC SERPL-MCNC: 75 MG/DL
POTASSIUM SERPL-SCNC: 5.4 MMOL/L
SODIUM SERPL-SCNC: 140 MMOL/L
TRIGL SERPL-MCNC: 66 MG/DL

## 2024-05-28 LAB — APO LP(A) SERPL-MCNC: 21.2 NMOL/L

## 2024-05-29 ENCOUNTER — APPOINTMENT (OUTPATIENT)
Dept: ORTHOPEDIC SURGERY | Facility: CLINIC | Age: 69
End: 2024-05-29
Payer: MEDICARE

## 2024-05-29 ENCOUNTER — NON-APPOINTMENT (OUTPATIENT)
Age: 69
End: 2024-05-29

## 2024-05-29 ENCOUNTER — TRANSCRIPTION ENCOUNTER (OUTPATIENT)
Age: 69
End: 2024-05-29

## 2024-05-29 VITALS
HEIGHT: 66 IN | OXYGEN SATURATION: 98 % | HEART RATE: 80 BPM | WEIGHT: 133 LBS | BODY MASS INDEX: 21.38 KG/M2 | TEMPERATURE: 98 F | SYSTOLIC BLOOD PRESSURE: 96 MMHG | DIASTOLIC BLOOD PRESSURE: 62 MMHG

## 2024-05-29 DIAGNOSIS — M17.12 UNILATERAL PRIMARY OSTEOARTHRITIS, LEFT KNEE: ICD-10-CM

## 2024-05-29 PROCEDURE — 99213 OFFICE O/P EST LOW 20 MIN: CPT | Mod: 25

## 2024-05-29 PROCEDURE — 20610 DRAIN/INJ JOINT/BURSA W/O US: CPT | Mod: LT

## 2024-05-31 PROBLEM — M17.12 PRIMARY LOCALIZED OSTEOARTHRITIS OF LEFT KNEE: Status: ACTIVE | Noted: 2024-05-31

## 2024-05-31 NOTE — PHYSICAL EXAM
[de-identified] : The patient is a well developed, well nourished female in no apparent distress. She is alert and oriented X 3 with a pleasant mood and appropriate affect.     On physical examination of the left knee, her ROM is 0-120 degrees. The patient walks with a normal gait and stands in neutral alignment. There is trace effusion. No warmth or erythema is noted. The patella is tender to palpation medially and laterally. There is patellofemoral crepitus noted. The apprehension and grind tests are negative. The extensor mechanism is intact. There is no joint line tenderness. The Juan sign is absent. The Lachman and pivot shift tests are negative. There is no varus or valgus laxity at 0 or 30 degrees. No posterolateral or anteromedial laxity is noted. No masses are palpable. No other soft tissue or bony tenderness is noted. There is some tenderness noted on palpation of the IT band. Quadriceps weakness is noted. Neurovascular function is intact.     [de-identified] : Radiographs of both knees from February 2024 show medial compartment DJD

## 2024-05-31 NOTE — DISCUSSION/SUMMARY
[de-identified] : Yulia has symptomatic DJD in her left knee. She received a cortisone injection today as she has not had relief from conservative measures with rest and Nsaids. She will slowly increase her activities as tolerated. She will call if any issues arise.

## 2024-05-31 NOTE — END OF VISIT
[FreeTextEntry3] : All medical record entries made by RUSS Peterson, acting as a scribe for this encounter under the direction of Garret Mendez MD . I have reviewed the chart and agree that the record accurately reflects my personal performance of the history, physical exam, assessment and plan. I have also personally directed, reviewed, and agreed with the chart. Injection was performed by Garret Mendez MD

## 2024-05-31 NOTE — HISTORY OF PRESENT ILLNESS
[de-identified] : Yulia returns today for evaluation of her left knee. She had issues with her right knee which improved significantly with a cortisone injection. She now reports anterior knee pain and stiffness. She has been unable to run. She has pain with stairs. She has intermittent swelling. She denies any locking or buckling. She has been taking Advil without much relief. She has stopped exercising due to pain. 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

## 2024-05-31 NOTE — PROCEDURE
[de-identified] : Under strict sterile technique, the left knee was prepped with Chloraprep. Using the superolateral approach, with the patient supine, 1ml of Kenalog was mixed with 5mls of 1% Lidocaine and 5mLs of 0.5% Marcaine, and was injected intraarticularly. The patient tolerated the procedure well. The patient was instructed to avoid vigorous exercise for 24 hours and will apply ice to the knee for 20 minutes 2-3 times per day if discomfort occurs. Patient will return on an as needed basis. The patient will call if any questions or problems should arise   Injection was performed by Garret Mendez MD

## 2024-06-04 ENCOUNTER — NON-APPOINTMENT (OUTPATIENT)
Age: 69
End: 2024-06-04

## 2024-06-04 ENCOUNTER — APPOINTMENT (OUTPATIENT)
Dept: HEART AND VASCULAR | Facility: CLINIC | Age: 69
End: 2024-06-04
Payer: MEDICARE

## 2024-06-04 VITALS
BODY MASS INDEX: 21.38 KG/M2 | RESPIRATION RATE: 16 BRPM | WEIGHT: 133 LBS | OXYGEN SATURATION: 98 % | HEIGHT: 66 IN | DIASTOLIC BLOOD PRESSURE: 73 MMHG | TEMPERATURE: 97.5 F | HEART RATE: 90 BPM | SYSTOLIC BLOOD PRESSURE: 103 MMHG

## 2024-06-04 PROCEDURE — 93000 ELECTROCARDIOGRAM COMPLETE: CPT

## 2024-06-04 PROCEDURE — 99203 OFFICE O/P NEW LOW 30 MIN: CPT | Mod: 25

## 2024-06-06 ENCOUNTER — APPOINTMENT (OUTPATIENT)
Dept: HEART AND VASCULAR | Facility: CLINIC | Age: 69
End: 2024-06-06
Payer: MEDICARE

## 2024-06-06 DIAGNOSIS — R55 SYNCOPE AND COLLAPSE: ICD-10-CM

## 2024-06-06 PROCEDURE — 93306 TTE W/DOPPLER COMPLETE: CPT

## 2024-06-07 PROBLEM — R55 SYNCOPE: Status: ACTIVE | Noted: 2024-04-05

## 2024-06-13 ENCOUNTER — TRANSCRIPTION ENCOUNTER (OUTPATIENT)
Age: 69
End: 2024-06-13

## 2024-06-27 ENCOUNTER — APPOINTMENT (OUTPATIENT)
Dept: ENDOCRINOLOGY | Facility: CLINIC | Age: 69
End: 2024-06-27

## 2024-07-02 ENCOUNTER — TRANSCRIPTION ENCOUNTER (OUTPATIENT)
Age: 69
End: 2024-07-02

## 2024-07-16 ENCOUNTER — APPOINTMENT (OUTPATIENT)
Dept: NEUROLOGY | Facility: CLINIC | Age: 69
End: 2024-07-16
Payer: MEDICARE

## 2024-07-16 PROCEDURE — 95819 EEG AWAKE AND ASLEEP: CPT

## 2024-07-17 ENCOUNTER — APPOINTMENT (OUTPATIENT)
Dept: ENDOCRINOLOGY | Facility: CLINIC | Age: 69
End: 2024-07-17
Payer: MEDICARE

## 2024-07-17 VITALS
DIASTOLIC BLOOD PRESSURE: 73 MMHG | SYSTOLIC BLOOD PRESSURE: 103 MMHG | HEART RATE: 74 BPM | BODY MASS INDEX: 19.69 KG/M2 | WEIGHT: 122 LBS

## 2024-07-17 DIAGNOSIS — I67.89 OTHER CEREBROVASCULAR DISEASE: ICD-10-CM

## 2024-07-17 DIAGNOSIS — E78.5 HYPERLIPIDEMIA, UNSPECIFIED: ICD-10-CM

## 2024-07-17 DIAGNOSIS — G47.30 SLEEP APNEA, UNSPECIFIED: ICD-10-CM

## 2024-07-17 DIAGNOSIS — R63.5 ABNORMAL WEIGHT GAIN: ICD-10-CM

## 2024-07-17 DIAGNOSIS — Z76.89 PERSONS ENCOUNTERING HEALTH SERVICES IN OTHER SPECIFIED CIRCUMSTANCES: ICD-10-CM

## 2024-07-17 DIAGNOSIS — R73.03 PREDIABETES.: ICD-10-CM

## 2024-07-17 LAB
GLUCOSE BLDC GLUCOMTR-MCNC: 109
HBA1C MFR BLD HPLC: 5.6

## 2024-07-17 PROCEDURE — 99214 OFFICE O/P EST MOD 30 MIN: CPT

## 2024-07-17 PROCEDURE — 82962 GLUCOSE BLOOD TEST: CPT

## 2024-07-17 PROCEDURE — 83036 HEMOGLOBIN GLYCOSYLATED A1C: CPT | Mod: QW

## 2024-07-17 PROCEDURE — G2211 COMPLEX E/M VISIT ADD ON: CPT

## 2024-07-18 ENCOUNTER — APPOINTMENT (OUTPATIENT)
Dept: RADIOLOGY | Facility: CLINIC | Age: 69
End: 2024-07-18
Payer: MEDICARE

## 2024-07-18 PROCEDURE — 77080 DXA BONE DENSITY AXIAL: CPT

## 2024-08-07 ENCOUNTER — APPOINTMENT (OUTPATIENT)
Dept: NEUROLOGY | Facility: CLINIC | Age: 69
End: 2024-08-07

## 2024-08-07 ENCOUNTER — TRANSCRIPTION ENCOUNTER (OUTPATIENT)
Age: 69
End: 2024-08-07

## 2024-08-07 PROCEDURE — 99213 OFFICE O/P EST LOW 20 MIN: CPT

## 2024-08-07 NOTE — DISCUSSION/SUMMARY
[FreeTextEntry1] : Reviewed MR brain and MRA head and neck 04/12/24 No significant vessel stenosis.  MRI brain does not show any hemorrhages or acute ischemia. She does have some FLAIR positive lesions suggestive of ischemic disease some of which are age appropriate.  will consider echo and arrythmia monitoring.   Cerebral microvascular disease - with elevated LDL over 100. will start Crestor 20 mg daily. follow up lipid level in July and follow up MRI in one year.   d/w patient

## 2024-08-07 NOTE — HISTORY OF PRESENT ILLNESS
[FreeTextEntry1] : CC: Follow up of syncope and microvascular ischemioa of chance brain Tolerating mounjaro and statin.  LDL 68.  Weight loss - 20 lbs  A1C - 6.0 Weight training Chicken and fish are main sources of proteins.

## 2024-08-28 ENCOUNTER — TRANSCRIPTION ENCOUNTER (OUTPATIENT)
Age: 69
End: 2024-08-28

## 2024-08-28 RX ORDER — TIRZEPATIDE 5 MG/.5ML
5 INJECTION, SOLUTION SUBCUTANEOUS
Qty: 1 | Refills: 3 | Status: ACTIVE | COMMUNITY
Start: 2024-08-28 | End: 1900-01-01

## 2024-08-28 RX ORDER — SYRINGE AND NEEDLE,INSULIN,1ML 31 GX5/16"
31G X 5/16" SYRINGE, EMPTY DISPOSABLE MISCELLANEOUS
Qty: 12 | Refills: 3 | Status: ACTIVE | COMMUNITY
Start: 2024-08-28 | End: 1900-01-01

## 2024-09-09 ENCOUNTER — APPOINTMENT (OUTPATIENT)
Dept: HEART AND VASCULAR | Facility: CLINIC | Age: 69
End: 2024-09-09
Payer: MEDICARE

## 2024-09-09 VITALS
WEIGHT: 120 LBS | HEIGHT: 66 IN | DIASTOLIC BLOOD PRESSURE: 60 MMHG | BODY MASS INDEX: 19.29 KG/M2 | HEART RATE: 63 BPM | SYSTOLIC BLOOD PRESSURE: 107 MMHG

## 2024-09-09 PROCEDURE — G2211 COMPLEX E/M VISIT ADD ON: CPT

## 2024-09-09 PROCEDURE — 93000 ELECTROCARDIOGRAM COMPLETE: CPT

## 2024-09-09 PROCEDURE — 99213 OFFICE O/P EST LOW 20 MIN: CPT

## 2024-09-09 NOTE — CARDIOLOGY SUMMARY
[de-identified] : 9/9/24 NSR, normal axis/intervals [de-identified] : Darline 8/15 - 8/29/24 Sinus rhythm with HR range 44-, non sustained SVT  [de-identified] : TTE 6/7/24 1. Left ventricular cavity is normal in size. Left ventricular wall thickness is normal. Left ventricular systolic function is normal with an ejection fraction of 57 % by Brewer's method of disks. There are no regional wall motion abnormalities seen. 2. Normal left ventricular diastolic function, with normal filling pressure. 3. Normal right ventricular cavity size, with normal wall thickness, and normal systolic function. 4. No significant valvular disease. 5. No pericardial effusion seen.

## 2024-09-09 NOTE — HISTORY OF PRESENT ILLNESS
[FreeTextEntry1] : Ms. Birch is a 69 year-old female with a history of GERD, pre-DM, and HLD who presents to [facility] care after 1 episode of syncope.  She reports that she was skiing in March 2024 and had a sudden LOC resulting in head trauma.  Subsequent workup revealed a small SAH which is being following by neurology.  She denies any prodrome prior to the episode.  In addition, she awoke a few seconds later per witnessed without any incontinence or post-ictal period.  She does report poor PO intake that day along with diminished fluid intake.  She denies any similar events prior or since this episode.  She does report brief episodes of postural dizziness.   She reports a stable exercise tolerance.  She denies chest pain, RAMIREZ, SOB, LE edema, orthopnea, PND, palpitations.

## 2024-09-09 NOTE — ADDENDUM
[FreeTextEntry1] : I, Crystal Perry, am scribing for and the presence of Dr. Jimenez the following sections: HPI, PMH,Family/social history, ROS, Physical Exam, Assessment / Plan.   I, Faith Jimenez, personally performed the services described in the documentation, reviewed the documentation recorded by the scribe in my presence and it accurately and completely records my words and actions.

## 2024-09-09 NOTE — HISTORY OF PRESENT ILLNESS
[FreeTextEntry1] : Ms. Birch is a 69 year-old female with a history of GERD, pre-DM, and HLD who presents to \[facility]\"" care after 1 episode of syncope.  She reports that she was skiing in March 2024 and had a sudden LOC resulting in head trauma.  Subsequent workup revealed a small SAH which is being following by neurology.  She denies any prodrome prior to the episode.  In addition, she awoke a few seconds later per witnessed without any incontinence or post-ictal period.  She does report poor PO intake that day along with diminished fluid intake.  She denies any similar events prior or since this episode.  She does report brief episodes of postural dizziness.   She reports a stable exercise tolerance.  She denies chest pain, RAMIREZ, SOB, LE edema, orthopnea, PND, palpitations.

## 2024-09-09 NOTE — CARDIOLOGY SUMMARY
[de-identified] : 9/9/24 NSR, normal axis/intervals [de-identified] : Darline 8/15 - 8/29/24 Sinus rhythm with HR range 44-, non sustained SVT  [de-identified] : TTE 6/7/24 1. Left ventricular cavity is normal in size. Left ventricular wall thickness is normal. Left ventricular systolic function is normal with an ejection fraction of 57 % by Brewer's method of disks. There are no regional wall motion abnormalities seen. 2. Normal left ventricular diastolic function, with normal filling pressure. 3. Normal right ventricular cavity size, with normal wall thickness, and normal systolic function. 4. No significant valvular disease. 5. No pericardial effusion seen.

## 2024-09-09 NOTE — CARDIOLOGY SUMMARY
[de-identified] : 9/9/24 NSR, normal axis/intervals [de-identified] : Darline 8/15 - 8/29/24 Sinus rhythm with HR range 44-, non sustained SVT  [de-identified] : TTE 6/7/24 1. Left ventricular cavity is normal in size. Left ventricular wall thickness is normal. Left ventricular systolic function is normal with an ejection fraction of 57 % by Brewer's method of disks. There are no regional wall motion abnormalities seen. 2. Normal left ventricular diastolic function, with normal filling pressure. 3. Normal right ventricular cavity size, with normal wall thickness, and normal systolic function. 4. No significant valvular disease. 5. No pericardial effusion seen.

## 2024-09-09 NOTE — DISCUSSION/SUMMARY
[FreeTextEntry1] : Ms. Birch is a 69 year-old with an episode of syncope without significant prodrome.  Given her reduced PO intake and recent initiation of GLP-1 agonist, I suspect hypotension is the most likely cause of her episode, but she does require further workup to rule out a cardiac etiology.    1.  Syncope LTM without sustained arrhythmia Echo with normal cardiac structure and function Recommend ILR for long-term heart rhythm assessment.  She will consider this.  All questions were answered to her apparent satisfaction

## 2024-09-09 NOTE — HISTORY OF PRESENT ILLNESS
[FreeTextEntry1] : Ms. Birch is a 69 year-old female with a history of GERD, pre-DM, and HLD who presents to [facility] care after 1 episode of syncope.  She reports that she was skiing in March 2024 and had a sudden LOC resulting in head trauma.  Subsequent workup revealed a small SAH which is being following by neurology.  She denies any prodrome prior to the episode.  In addition, she awoke a few seconds later per witnessed without any incontinence or post-ictal period.  She does report poor PO intake that day along with diminished fluid intake.  She denies any similar events prior or since this episode.  She does report brief episodes of postural dizziness.   She reports a stable exercise tolerance.  She denies chest pain, RAMIREZ, SOB, LE edema, orthopnea, PND, palpitations.

## 2024-09-10 ENCOUNTER — NON-APPOINTMENT (OUTPATIENT)
Age: 69
End: 2024-09-10

## 2024-09-10 ENCOUNTER — RX RENEWAL (OUTPATIENT)
Age: 69
End: 2024-09-10

## 2024-09-11 ENCOUNTER — TRANSCRIPTION ENCOUNTER (OUTPATIENT)
Age: 69
End: 2024-09-11

## 2024-09-12 ENCOUNTER — TRANSCRIPTION ENCOUNTER (OUTPATIENT)
Age: 69
End: 2024-09-12

## 2024-09-12 ENCOUNTER — APPOINTMENT (OUTPATIENT)
Dept: ORTHOPEDIC SURGERY | Facility: CLINIC | Age: 69
End: 2024-09-12
Payer: MEDICARE

## 2024-09-12 VITALS
BODY MASS INDEX: 19.29 KG/M2 | WEIGHT: 120 LBS | HEIGHT: 66 IN | SYSTOLIC BLOOD PRESSURE: 100 MMHG | OXYGEN SATURATION: 99 % | TEMPERATURE: 97.6 F | DIASTOLIC BLOOD PRESSURE: 68 MMHG | HEART RATE: 68 BPM

## 2024-09-12 DIAGNOSIS — M17.12 UNILATERAL PRIMARY OSTEOARTHRITIS, LEFT KNEE: ICD-10-CM

## 2024-09-12 PROCEDURE — 99213 OFFICE O/P EST LOW 20 MIN: CPT | Mod: 25

## 2024-09-12 PROCEDURE — 20610 DRAIN/INJ JOINT/BURSA W/O US: CPT | Mod: LT

## 2024-09-16 NOTE — DISCUSSION/SUMMARY
[de-identified] : Yulia has symptomatic DJD in her left knee. She received a cortisone injection today as she has not had relief from conservative measures with rest and Nsaids. She will slowly increase her activities as tolerated. She will call if any issues arise.

## 2024-09-16 NOTE — PHYSICAL EXAM
[de-identified] : The patient is a well developed, well nourished female in no apparent distress. She is alert and oriented X 3 with a pleasant mood and appropriate affect.     On physical examination of the left knee, her ROM is 0-120 degrees. The patient walks with a normal gait and stands in neutral alignment. There is trace effusion. No warmth or erythema is noted. The patella is tender to palpation medially and laterally. There is patellofemoral crepitus noted. The apprehension and grind tests are negative. The extensor mechanism is intact. There is no joint line tenderness. The Juan sign is absent. The Lachman and pivot shift tests are negative. There is no varus or valgus laxity at 0 or 30 degrees. No posterolateral or anteromedial laxity is noted. No masses are palpable. No other soft tissue or bony tenderness is noted. There is some tenderness noted on palpation of the IT band. Quadriceps weakness is noted. Neurovascular function is intact.

## 2024-09-16 NOTE — PHYSICAL EXAM
[de-identified] : The patient is a well developed, well nourished female in no apparent distress. She is alert and oriented X 3 with a pleasant mood and appropriate affect.     On physical examination of the left knee, her ROM is 0-120 degrees. The patient walks with a normal gait and stands in neutral alignment. There is trace effusion. No warmth or erythema is noted. The patella is tender to palpation medially and laterally. There is patellofemoral crepitus noted. The apprehension and grind tests are negative. The extensor mechanism is intact. There is no joint line tenderness. The Juan sign is absent. The Lachman and pivot shift tests are negative. There is no varus or valgus laxity at 0 or 30 degrees. No posterolateral or anteromedial laxity is noted. No masses are palpable. No other soft tissue or bony tenderness is noted. There is some tenderness noted on palpation of the IT band. Quadriceps weakness is noted. Neurovascular function is intact.

## 2024-09-16 NOTE — PROCEDURE
[de-identified] : Under strict sterile technique, the left knee was prepped with Chloraprep. Using the superolateral approach, with the patient supine, 1ml of Kenalog was mixed with 5mls of 1% Lidocaine and 5mLs of 0.5% Marcaine, and was injected intraarticularly. The patient tolerated the procedure well. The patient was instructed to avoid vigorous exercise for 24 hours and will apply ice to the knee for 20 minutes 2-3 times per day if discomfort occurs. Patient will return on an as needed basis. The patient will call if any questions or problems should arise   Injection was performed by Garret Mendez MD

## 2024-09-16 NOTE — DISCUSSION/SUMMARY
[de-identified] : Yulia has symptomatic DJD in her left knee. She received a cortisone injection today as she has not had relief from conservative measures with rest and Nsaids. She will slowly increase her activities as tolerated. She will call if any issues arise.

## 2024-09-16 NOTE — PROCEDURE
[de-identified] : Under strict sterile technique, the left knee was prepped with Chloraprep. Using the superolateral approach, with the patient supine, 1ml of Kenalog was mixed with 5mls of 1% Lidocaine and 5mLs of 0.5% Marcaine, and was injected intraarticularly. The patient tolerated the procedure well. The patient was instructed to avoid vigorous exercise for 24 hours and will apply ice to the knee for 20 minutes 2-3 times per day if discomfort occurs. Patient will return on an as needed basis. The patient will call if any questions or problems should arise   Injection was performed by Garret Mendez MD

## 2024-09-16 NOTE — PROCEDURE
[de-identified] : Under strict sterile technique, the left knee was prepped with Chloraprep. Using the superolateral approach, with the patient supine, 1ml of Kenalog was mixed with 5mls of 1% Lidocaine and 5mLs of 0.5% Marcaine, and was injected intraarticularly. The patient tolerated the procedure well. The patient was instructed to avoid vigorous exercise for 24 hours and will apply ice to the knee for 20 minutes 2-3 times per day if discomfort occurs. Patient will return on an as needed basis. The patient will call if any questions or problems should arise   Injection was performed by Garret Mendez MD

## 2024-09-16 NOTE — HISTORY OF PRESENT ILLNESS
[de-identified] : Yulia returns today for evaluation of her left knee. She had issues with her right knee which improved significantly with a cortisone injection. She now reports anterior knee pain and stiffness. She has been unable to run. She has pain with stairs. She has intermittent swelling. She denies any locking or buckling. She has been taking Advil without much relief. She has stopped exercising due to pain. 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 She has lost weight but her knee continues to hurt. She is going to [place] for the month and leaving next week

## 2024-09-16 NOTE — DISCUSSION/SUMMARY
[de-identified] : Yulia has symptomatic DJD in her left knee. She received a cortisone injection today as she has not had relief from conservative measures with rest and Nsaids. She will slowly increase her activities as tolerated. She will call if any issues arise.

## 2024-09-16 NOTE — PHYSICAL EXAM
[de-identified] : The patient is a well developed, well nourished female in no apparent distress. She is alert and oriented X 3 with a pleasant mood and appropriate affect.     On physical examination of the left knee, her ROM is 0-120 degrees. The patient walks with a normal gait and stands in neutral alignment. There is trace effusion. No warmth or erythema is noted. The patella is tender to palpation medially and laterally. There is patellofemoral crepitus noted. The apprehension and grind tests are negative. The extensor mechanism is intact. There is no joint line tenderness. The Juan sign is absent. The Lachman and pivot shift tests are negative. There is no varus or valgus laxity at 0 or 30 degrees. No posterolateral or anteromedial laxity is noted. No masses are palpable. No other soft tissue or bony tenderness is noted. There is some tenderness noted on palpation of the IT band. Quadriceps weakness is noted. Neurovascular function is intact.

## 2024-09-16 NOTE — END OF VISIT
[FreeTextEntry3] : Dr Mendez personally administered the injection today. Dr Mendez has reviewed the chart and agrees that the record accurately reflects his personal performance of the history, physical exam, assessment, and plan. He personally directed, reviewed, and agreed with the chart.All medical record entries made by RUSS Peterson, acting as a scribe for this encounter under the direction of Garret Mendez MD

## 2024-09-16 NOTE — HISTORY OF PRESENT ILLNESS
[de-identified] : Yulia returns today for evaluation of her left knee. She had issues with her right knee which improved significantly with a cortisone injection. She now reports anterior knee pain and stiffness. She has been unable to run. She has pain with stairs. She has intermittent swelling. She denies any locking or buckling. She has been taking Advil without much relief. She has stopped exercising due to pain. 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 She has lost weight but her knee continues to hurt. She is going to [place] for the month and leaving next week

## 2024-09-16 NOTE — HISTORY OF PRESENT ILLNESS
[de-identified] : Yulia returns today for evaluation of her left knee. She had issues with her right knee which improved significantly with a cortisone injection. She now reports anterior knee pain and stiffness. She has been unable to run. She has pain with stairs. She has intermittent swelling. She denies any locking or buckling. She has been taking Advil without much relief. She has stopped exercising due to pain. 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 She has lost weight but her knee continues to hurt. She is going to [place] for the month and leaving next week

## 2024-10-21 ENCOUNTER — TRANSCRIPTION ENCOUNTER (OUTPATIENT)
Age: 69
End: 2024-10-21

## 2024-10-22 ENCOUNTER — TRANSCRIPTION ENCOUNTER (OUTPATIENT)
Age: 69
End: 2024-10-22

## 2024-10-30 ENCOUNTER — TRANSCRIPTION ENCOUNTER (OUTPATIENT)
Age: 69
End: 2024-10-30

## 2024-10-30 RX ORDER — TIRZEPATIDE 5 MG/.5ML
5 INJECTION, SOLUTION SUBCUTANEOUS
Qty: 2 | Refills: 3 | Status: ACTIVE | COMMUNITY
Start: 2024-10-30 | End: 1900-01-01

## 2024-10-31 ENCOUNTER — TRANSCRIPTION ENCOUNTER (OUTPATIENT)
Age: 69
End: 2024-10-31

## 2024-11-01 ENCOUNTER — NON-APPOINTMENT (OUTPATIENT)
Age: 69
End: 2024-11-01

## 2024-11-08 ENCOUNTER — TRANSCRIPTION ENCOUNTER (OUTPATIENT)
Age: 69
End: 2024-11-08

## 2024-11-08 RX ORDER — TIRZEPATIDE 5 MG/.5ML
5 INJECTION, SOLUTION SUBCUTANEOUS
Qty: 1 | Refills: 3 | Status: ACTIVE | COMMUNITY
Start: 2024-10-31 | End: 1900-01-01

## 2024-11-13 ENCOUNTER — TRANSCRIPTION ENCOUNTER (OUTPATIENT)
Age: 69
End: 2024-11-13

## 2024-11-14 ENCOUNTER — TRANSCRIPTION ENCOUNTER (OUTPATIENT)
Age: 69
End: 2024-11-14

## 2024-11-15 ENCOUNTER — TRANSCRIPTION ENCOUNTER (OUTPATIENT)
Age: 69
End: 2024-11-15

## 2024-11-21 ENCOUNTER — TRANSCRIPTION ENCOUNTER (OUTPATIENT)
Age: 69
End: 2024-11-21

## 2024-12-02 ENCOUNTER — TRANSCRIPTION ENCOUNTER (OUTPATIENT)
Age: 69
End: 2024-12-02

## 2024-12-03 ENCOUNTER — APPOINTMENT (OUTPATIENT)
Dept: ENDOCRINOLOGY | Facility: CLINIC | Age: 69
End: 2024-12-03
Payer: MEDICARE

## 2024-12-03 ENCOUNTER — TRANSCRIPTION ENCOUNTER (OUTPATIENT)
Age: 69
End: 2024-12-03

## 2024-12-03 VITALS
DIASTOLIC BLOOD PRESSURE: 53 MMHG | SYSTOLIC BLOOD PRESSURE: 110 MMHG | BODY MASS INDEX: 19.21 KG/M2 | WEIGHT: 119 LBS | HEART RATE: 59 BPM

## 2024-12-03 DIAGNOSIS — E78.5 HYPERLIPIDEMIA, UNSPECIFIED: ICD-10-CM

## 2024-12-03 DIAGNOSIS — R68.89 OTHER GENERAL SYMPTOMS AND SIGNS: ICD-10-CM

## 2024-12-03 DIAGNOSIS — R73.03 PREDIABETES.: ICD-10-CM

## 2024-12-03 DIAGNOSIS — E83.52 HYPERCALCEMIA: ICD-10-CM

## 2024-12-03 DIAGNOSIS — E55.9 VITAMIN D DEFICIENCY, UNSPECIFIED: ICD-10-CM

## 2024-12-03 DIAGNOSIS — R63.5 ABNORMAL WEIGHT GAIN: ICD-10-CM

## 2024-12-03 PROCEDURE — 36415 COLL VENOUS BLD VENIPUNCTURE: CPT

## 2024-12-03 PROCEDURE — G2211 COMPLEX E/M VISIT ADD ON: CPT

## 2024-12-03 PROCEDURE — 99214 OFFICE O/P EST MOD 30 MIN: CPT

## 2024-12-05 ENCOUNTER — TRANSCRIPTION ENCOUNTER (OUTPATIENT)
Age: 69
End: 2024-12-05

## 2024-12-05 LAB
25(OH)D3 SERPL-MCNC: 91.9 NG/ML
ALBUMIN SERPL ELPH-MCNC: 4.4 G/DL
ALP BLD-CCNC: 51 U/L
ALT SERPL-CCNC: 29 U/L
ANION GAP SERPL CALC-SCNC: 13 MMOL/L
AST SERPL-CCNC: 33 U/L
BASOPHILS # BLD AUTO: 0.04 K/UL
BASOPHILS NFR BLD AUTO: 0.7 %
BILIRUB SERPL-MCNC: 0.2 MG/DL
BUN SERPL-MCNC: 24 MG/DL
CALCIUM SERPL-MCNC: 10.1 MG/DL
CALCIUM SERPL-MCNC: 10.1 MG/DL
CHLORIDE SERPL-SCNC: 107 MMOL/L
CHOLEST SERPL-MCNC: 162 MG/DL
CO2 SERPL-SCNC: 26 MMOL/L
CREAT SERPL-MCNC: 0.8 MG/DL
EGFR: 79 ML/MIN/1.73M2
EOSINOPHIL # BLD AUTO: 0.12 K/UL
EOSINOPHIL NFR BLD AUTO: 2.2 %
ESTIMATED AVERAGE GLUCOSE: 111 MG/DL
GLUCOSE SERPL-MCNC: 90 MG/DL
HBA1C MFR BLD HPLC: 5.5 %
HCT VFR BLD CALC: 38.7 %
HDLC SERPL-MCNC: 84 MG/DL
HGB BLD-MCNC: 12.3 G/DL
IMM GRANULOCYTES NFR BLD AUTO: 0 %
LDLC SERPL CALC-MCNC: 66 MG/DL
LYMPHOCYTES # BLD AUTO: 1.22 K/UL
LYMPHOCYTES NFR BLD AUTO: 22.5 %
MAN DIFF?: NORMAL
MCHC RBC-ENTMCNC: 30.5 PG
MCHC RBC-ENTMCNC: 31.8 G/DL
MCV RBC AUTO: 96 FL
MONOCYTES # BLD AUTO: 0.39 K/UL
MONOCYTES NFR BLD AUTO: 7.2 %
NEUTROPHILS # BLD AUTO: 3.65 K/UL
NEUTROPHILS NFR BLD AUTO: 67.4 %
NONHDLC SERPL-MCNC: 78 MG/DL
PARATHYROID HORMONE INTACT: 25 PG/ML
PLATELET # BLD AUTO: 197 K/UL
POTASSIUM SERPL-SCNC: 4.9 MMOL/L
PROT SERPL-MCNC: 6.8 G/DL
RBC # BLD: 4.03 M/UL
RBC # FLD: 14 %
SODIUM SERPL-SCNC: 146 MMOL/L
T3 SERPL-MCNC: 98 NG/DL
T4 FREE SERPL-MCNC: 1.2 NG/DL
TRIGL SERPL-MCNC: 62 MG/DL
TSH SERPL-ACNC: 1.67 UIU/ML
WBC # FLD AUTO: 5.42 K/UL

## 2024-12-27 ENCOUNTER — RX RENEWAL (OUTPATIENT)
Age: 69
End: 2024-12-27

## 2025-05-07 ENCOUNTER — APPOINTMENT (OUTPATIENT)
Dept: MRI IMAGING | Facility: HOSPITAL | Age: 70
End: 2025-05-07

## 2025-05-07 ENCOUNTER — OUTPATIENT (OUTPATIENT)
Dept: OUTPATIENT SERVICES | Facility: HOSPITAL | Age: 70
LOS: 1 days | End: 2025-05-07
Payer: MEDICARE

## 2025-05-07 DIAGNOSIS — Z98.890 OTHER SPECIFIED POSTPROCEDURAL STATES: Chronic | ICD-10-CM

## 2025-05-07 DIAGNOSIS — Z98.89 OTHER SPECIFIED POSTPROCEDURAL STATES: Chronic | ICD-10-CM

## 2025-05-07 DIAGNOSIS — Z96.641 PRESENCE OF RIGHT ARTIFICIAL HIP JOINT: Chronic | ICD-10-CM

## 2025-05-07 PROCEDURE — 70551 MRI BRAIN STEM W/O DYE: CPT

## 2025-05-07 PROCEDURE — 70551 MRI BRAIN STEM W/O DYE: CPT | Mod: 26

## 2025-05-15 ENCOUNTER — NON-APPOINTMENT (OUTPATIENT)
Age: 70
End: 2025-05-15

## 2025-05-15 ENCOUNTER — APPOINTMENT (OUTPATIENT)
Dept: NEUROLOGY | Facility: CLINIC | Age: 70
End: 2025-05-15
Payer: MEDICARE

## 2025-05-15 VITALS
WEIGHT: 120 LBS | SYSTOLIC BLOOD PRESSURE: 99 MMHG | HEIGHT: 66 IN | TEMPERATURE: 97.2 F | BODY MASS INDEX: 19.29 KG/M2 | HEART RATE: 55 BPM | DIASTOLIC BLOOD PRESSURE: 64 MMHG | OXYGEN SATURATION: 97 %

## 2025-05-15 VITALS — DIASTOLIC BLOOD PRESSURE: 65 MMHG | SYSTOLIC BLOOD PRESSURE: 97 MMHG

## 2025-05-15 DIAGNOSIS — I67.89 OTHER CEREBROVASCULAR DISEASE: ICD-10-CM

## 2025-05-15 DIAGNOSIS — R25.2 CRAMP AND SPASM: ICD-10-CM

## 2025-05-15 PROCEDURE — 99214 OFFICE O/P EST MOD 30 MIN: CPT

## 2025-05-16 PROBLEM — R25.2 CRAMPS, MUSCLE, GENERAL: Status: ACTIVE | Noted: 2025-05-16

## 2025-05-16 LAB
CHOLEST SERPL-MCNC: 144 MG/DL
CRP SERPL HS-MCNC: 0.19 MG/L
ERYTHROCYTE [SEDIMENTATION RATE] IN BLOOD BY WESTERGREN METHOD: 4 MM/HR
ESTIMATED AVERAGE GLUCOSE: 111 MG/DL
HBA1C MFR BLD HPLC: 5.5 %
HDLC SERPL-MCNC: 77 MG/DL
LDLC SERPL-MCNC: 54 MG/DL
NONHDLC SERPL-MCNC: 67 MG/DL
TRIGL SERPL-MCNC: 61 MG/DL

## 2025-05-19 LAB — APO LP(A) SERPL-MCNC: 15.6 NMOL/L

## 2025-06-02 ENCOUNTER — TRANSCRIPTION ENCOUNTER (OUTPATIENT)
Age: 70
End: 2025-06-02

## 2025-06-02 RX ORDER — SIMVASTATIN 20 MG/1
20 TABLET, FILM COATED ORAL
Qty: 90 | Refills: 0 | Status: ACTIVE | COMMUNITY
Start: 2025-06-02 | End: 1900-01-01

## 2025-06-04 ENCOUNTER — APPOINTMENT (OUTPATIENT)
Dept: ENDOCRINOLOGY | Facility: CLINIC | Age: 70
End: 2025-06-04
Payer: MEDICARE

## 2025-06-04 VITALS
WEIGHT: 118 LBS | HEART RATE: 83 BPM | HEIGHT: 66 IN | DIASTOLIC BLOOD PRESSURE: 53 MMHG | BODY MASS INDEX: 18.96 KG/M2 | SYSTOLIC BLOOD PRESSURE: 87 MMHG

## 2025-06-04 DIAGNOSIS — R73.03 PREDIABETES.: ICD-10-CM

## 2025-06-04 DIAGNOSIS — E83.52 HYPERCALCEMIA: ICD-10-CM

## 2025-06-04 DIAGNOSIS — E78.5 HYPERLIPIDEMIA, UNSPECIFIED: ICD-10-CM

## 2025-06-04 DIAGNOSIS — Z76.89 PERSONS ENCOUNTERING HEALTH SERVICES IN OTHER SPECIFIED CIRCUMSTANCES: ICD-10-CM

## 2025-06-04 LAB
GLUCOSE BLDC GLUCOMTR-MCNC: 171
HBA1C MFR BLD HPLC: 5.6

## 2025-06-04 PROCEDURE — 36415 COLL VENOUS BLD VENIPUNCTURE: CPT

## 2025-06-04 PROCEDURE — 82962 GLUCOSE BLOOD TEST: CPT

## 2025-06-04 PROCEDURE — 83036 HEMOGLOBIN GLYCOSYLATED A1C: CPT | Mod: QW

## 2025-06-04 PROCEDURE — 99214 OFFICE O/P EST MOD 30 MIN: CPT

## 2025-06-05 ENCOUNTER — NON-APPOINTMENT (OUTPATIENT)
Age: 70
End: 2025-06-05

## 2025-06-05 LAB
25(OH)D3 SERPL-MCNC: 73.3 NG/ML
ALBUMIN SERPL ELPH-MCNC: 4.5 G/DL
ALP BLD-CCNC: 51 U/L
ALT SERPL-CCNC: 30 U/L
ANION GAP SERPL CALC-SCNC: 12 MMOL/L
AST SERPL-CCNC: 32 U/L
BASOPHILS # BLD AUTO: 0.03 K/UL
BASOPHILS NFR BLD AUTO: 0.5 %
BILIRUB SERPL-MCNC: 0.3 MG/DL
BUN SERPL-MCNC: 33 MG/DL
CALCIUM SERPL-MCNC: 10.3 MG/DL
CALCIUM SERPL-MCNC: 10.3 MG/DL
CHLORIDE SERPL-SCNC: 104 MMOL/L
CHOLEST SERPL-MCNC: 213 MG/DL
CO2 SERPL-SCNC: 23 MMOL/L
CREAT SERPL-MCNC: 0.89 MG/DL
EGFRCR SERPLBLD CKD-EPI 2021: 69 ML/MIN/1.73M2
EOSINOPHIL # BLD AUTO: 0.11 K/UL
EOSINOPHIL NFR BLD AUTO: 1.8 %
ESTIMATED AVERAGE GLUCOSE: 111 MG/DL
GLUCOSE SERPL-MCNC: 94 MG/DL
HBA1C MFR BLD HPLC: 5.5 %
HCT VFR BLD CALC: 40.1 %
HDLC SERPL-MCNC: 75 MG/DL
HGB BLD-MCNC: 12.7 G/DL
IMM GRANULOCYTES NFR BLD AUTO: 0.3 %
LDLC SERPL-MCNC: 123 MG/DL
LYMPHOCYTES # BLD AUTO: 1.1 K/UL
LYMPHOCYTES NFR BLD AUTO: 17.9 %
MAN DIFF?: NORMAL
MCHC RBC-ENTMCNC: 30.2 PG
MCHC RBC-ENTMCNC: 31.7 G/DL
MCV RBC AUTO: 95.2 FL
MONOCYTES # BLD AUTO: 0.38 K/UL
MONOCYTES NFR BLD AUTO: 6.2 %
NEUTROPHILS # BLD AUTO: 4.49 K/UL
NEUTROPHILS NFR BLD AUTO: 73.3 %
NONHDLC SERPL-MCNC: 138 MG/DL
PARATHYROID HORMONE INTACT: 17 PG/ML
PLATELET # BLD AUTO: 206 K/UL
POTASSIUM SERPL-SCNC: 4.6 MMOL/L
PROT SERPL-MCNC: 6.8 G/DL
RBC # BLD: 4.21 M/UL
RBC # FLD: 13.9 %
SODIUM SERPL-SCNC: 139 MMOL/L
TRIGL SERPL-MCNC: 84 MG/DL
WBC # FLD AUTO: 6.13 K/UL

## 2025-07-07 ENCOUNTER — TRANSCRIPTION ENCOUNTER (OUTPATIENT)
Age: 70
End: 2025-07-07

## 2025-07-07 RX ORDER — EZETIMIBE 10 MG/1
10 TABLET ORAL
Qty: 90 | Refills: 3 | Status: ACTIVE | COMMUNITY
Start: 2025-07-07 | End: 1900-01-01

## 2025-07-08 ENCOUNTER — TRANSCRIPTION ENCOUNTER (OUTPATIENT)
Age: 70
End: 2025-07-08

## 2025-07-08 RX ORDER — EVOLOCUMAB 140 MG/ML
140 INJECTION, SOLUTION SUBCUTANEOUS
Qty: 6 | Refills: 3 | Status: ACTIVE | COMMUNITY
Start: 2025-07-08 | End: 1900-01-01

## 2025-07-09 ENCOUNTER — TRANSCRIPTION ENCOUNTER (OUTPATIENT)
Age: 70
End: 2025-07-09

## 2025-07-10 ENCOUNTER — APPOINTMENT (OUTPATIENT)
Dept: ORTHOPEDIC SURGERY | Facility: CLINIC | Age: 70
End: 2025-07-10
Payer: MEDICARE

## 2025-07-10 VITALS
WEIGHT: 118 LBS | DIASTOLIC BLOOD PRESSURE: 60 MMHG | SYSTOLIC BLOOD PRESSURE: 93 MMHG | BODY MASS INDEX: 18.96 KG/M2 | TEMPERATURE: 97.7 F | HEART RATE: 70 BPM | OXYGEN SATURATION: 98 % | HEIGHT: 66 IN

## 2025-07-10 PROCEDURE — 20610 DRAIN/INJ JOINT/BURSA W/O US: CPT | Mod: RT

## 2025-07-11 ENCOUNTER — TRANSCRIPTION ENCOUNTER (OUTPATIENT)
Age: 70
End: 2025-07-11

## 2025-07-24 ENCOUNTER — TRANSCRIPTION ENCOUNTER (OUTPATIENT)
Age: 70
End: 2025-07-24

## 2025-08-25 ENCOUNTER — RX RENEWAL (OUTPATIENT)
Age: 70
End: 2025-08-25

## 2025-09-03 ENCOUNTER — APPOINTMENT (OUTPATIENT)
Dept: PULMONOLOGY | Facility: CLINIC | Age: 70
End: 2025-09-03
Payer: MEDICARE

## 2025-09-03 VITALS
OXYGEN SATURATION: 98 % | WEIGHT: 122 LBS | BODY MASS INDEX: 19.61 KG/M2 | TEMPERATURE: 97.9 F | SYSTOLIC BLOOD PRESSURE: 107 MMHG | HEIGHT: 66 IN | DIASTOLIC BLOOD PRESSURE: 59 MMHG | HEART RATE: 64 BPM

## 2025-09-03 DIAGNOSIS — G47.33 OBSTRUCTIVE SLEEP APNEA (ADULT) (PEDIATRIC): ICD-10-CM

## 2025-09-03 PROCEDURE — 99204 OFFICE O/P NEW MOD 45 MIN: CPT

## 2025-09-03 PROCEDURE — G2211 COMPLEX E/M VISIT ADD ON: CPT

## 2025-09-04 PROBLEM — G47.33 OBSTRUCTIVE SLEEP APNEA: Status: ACTIVE | Noted: 2025-09-04

## 2025-09-09 ENCOUNTER — APPOINTMENT (OUTPATIENT)
Dept: SURGERY | Facility: CLINIC | Age: 70
End: 2025-09-09
Payer: MEDICARE

## 2025-09-09 VITALS
HEIGHT: 66 IN | SYSTOLIC BLOOD PRESSURE: 95 MMHG | HEART RATE: 59 BPM | DIASTOLIC BLOOD PRESSURE: 63 MMHG | OXYGEN SATURATION: 99 % | BODY MASS INDEX: 19.13 KG/M2 | WEIGHT: 119 LBS

## 2025-09-09 DIAGNOSIS — K21.9 GASTRO-ESOPHAGEAL REFLUX DISEASE W/OUT ESOPHAGITIS: ICD-10-CM

## 2025-09-09 DIAGNOSIS — E78.5 HYPERLIPIDEMIA, UNSPECIFIED: ICD-10-CM

## 2025-09-09 DIAGNOSIS — Z78.9 OTHER SPECIFIED HEALTH STATUS: ICD-10-CM

## 2025-09-09 DIAGNOSIS — F12.90 CANNABIS USE, UNSPECIFIED, UNCOMPLICATED: ICD-10-CM

## 2025-09-09 DIAGNOSIS — M16.10 UNILATERAL PRIMARY OSTEOARTHRITIS, UNSPECIFIED HIP: ICD-10-CM

## 2025-09-09 DIAGNOSIS — I67.89 OTHER CEREBROVASCULAR DISEASE: ICD-10-CM

## 2025-09-09 DIAGNOSIS — Z83.3 FAMILY HISTORY OF DIABETES MELLITUS: ICD-10-CM

## 2025-09-09 DIAGNOSIS — R10.31 RIGHT LOWER QUADRANT PAIN: ICD-10-CM

## 2025-09-09 PROCEDURE — 99203 OFFICE O/P NEW LOW 30 MIN: CPT

## 2025-09-09 PROCEDURE — G2211 COMPLEX E/M VISIT ADD ON: CPT

## 2025-09-10 ENCOUNTER — APPOINTMENT (OUTPATIENT)
Dept: ULTRASOUND IMAGING | Facility: CLINIC | Age: 70
End: 2025-09-10
Payer: MEDICARE

## 2025-09-10 PROCEDURE — 76857 US EXAM PELVIC LIMITED: CPT | Mod: 26

## 2025-09-11 ENCOUNTER — APPOINTMENT (OUTPATIENT)
Dept: SLEEP CENTER | Facility: HOME HEALTH | Age: 70
End: 2025-09-11